# Patient Record
Sex: MALE | Race: WHITE | NOT HISPANIC OR LATINO | Employment: OTHER | URBAN - METROPOLITAN AREA
[De-identification: names, ages, dates, MRNs, and addresses within clinical notes are randomized per-mention and may not be internally consistent; named-entity substitution may affect disease eponyms.]

---

## 2018-02-27 ENCOUNTER — HOSPITAL ENCOUNTER (INPATIENT)
Facility: MEDICAL CENTER | Age: 58
LOS: 3 days | DRG: 247 | End: 2018-03-02
Attending: EMERGENCY MEDICINE | Admitting: HOSPITALIST
Payer: MEDICARE

## 2018-02-27 ENCOUNTER — APPOINTMENT (OUTPATIENT)
Dept: RADIOLOGY | Facility: MEDICAL CENTER | Age: 58
DRG: 247 | End: 2018-02-27
Attending: EMERGENCY MEDICINE
Payer: MEDICARE

## 2018-02-27 ENCOUNTER — RESOLUTE PROFESSIONAL BILLING HOSPITAL PROF FEE (OUTPATIENT)
Dept: HOSPITALIST | Facility: MEDICAL CENTER | Age: 58
End: 2018-02-27
Payer: COMMERCIAL

## 2018-02-27 PROBLEM — R73.9 HYPERGLYCEMIA: Status: ACTIVE | Noted: 2018-02-27

## 2018-02-27 PROBLEM — Z78.9 ALCOHOL USE: Status: ACTIVE | Noted: 2018-02-27

## 2018-02-27 PROBLEM — I21.3 STEMI (ST ELEVATION MYOCARDIAL INFARCTION) (HCC): Status: ACTIVE | Noted: 2018-02-27

## 2018-02-27 PROBLEM — D72.829 LEUKOCYTOSIS: Status: ACTIVE | Noted: 2018-02-27

## 2018-02-27 PROBLEM — I21.09 ACUTE ST ELEVATION MYOCARDIAL INFARCTION (STEMI) OF ANTERIOR WALL (HCC): Status: ACTIVE | Noted: 2018-02-27

## 2018-02-27 PROBLEM — E87.1 HYPONATREMIA: Status: ACTIVE | Noted: 2018-02-27

## 2018-02-27 PROBLEM — F17.200 SMOKER: Status: ACTIVE | Noted: 2018-02-27

## 2018-02-27 LAB
ALBUMIN SERPL BCP-MCNC: 4.7 G/DL (ref 3.2–4.9)
ALBUMIN/GLOB SERPL: 1.6 G/DL
ALP SERPL-CCNC: 94 U/L (ref 30–99)
ALT SERPL-CCNC: 40 U/L (ref 2–50)
ANION GAP SERPL CALC-SCNC: 13 MMOL/L (ref 0–11.9)
APTT PPP: 24.2 SEC (ref 24.7–36)
AST SERPL-CCNC: 73 U/L (ref 12–45)
BASOPHILS # BLD AUTO: 0.4 % (ref 0–1.8)
BASOPHILS # BLD: 0.06 K/UL (ref 0–0.12)
BILIRUB SERPL-MCNC: 0.8 MG/DL (ref 0.1–1.5)
BNP SERPL-MCNC: 191 PG/ML (ref 0–100)
BUN SERPL-MCNC: 8 MG/DL (ref 8–22)
CALCIUM SERPL-MCNC: 9.3 MG/DL (ref 8.5–10.5)
CHLORIDE SERPL-SCNC: 95 MMOL/L (ref 96–112)
CHOLEST SERPL-MCNC: 167 MG/DL (ref 100–199)
CO2 SERPL-SCNC: 22 MMOL/L (ref 20–33)
CREAT SERPL-MCNC: 0.72 MG/DL (ref 0.5–1.4)
EKG IMPRESSION: NORMAL
EOSINOPHIL # BLD AUTO: 0.04 K/UL (ref 0–0.51)
EOSINOPHIL NFR BLD: 0.3 % (ref 0–6.9)
ERYTHROCYTE [DISTWIDTH] IN BLOOD BY AUTOMATED COUNT: 40.1 FL (ref 35.9–50)
EST. AVERAGE GLUCOSE BLD GHB EST-MCNC: 197 MG/DL
GLOBULIN SER CALC-MCNC: 3 G/DL (ref 1.9–3.5)
GLUCOSE BLD-MCNC: 177 MG/DL (ref 65–99)
GLUCOSE BLD-MCNC: 254 MG/DL (ref 65–99)
GLUCOSE SERPL-MCNC: 206 MG/DL (ref 65–99)
HBA1C MFR BLD: 8.5 % (ref 0–5.6)
HCT VFR BLD AUTO: 47.8 % (ref 42–52)
HDLC SERPL-MCNC: 51 MG/DL
HGB BLD-MCNC: 17.2 G/DL (ref 14–18)
IMM GRANULOCYTES # BLD AUTO: 0.08 K/UL (ref 0–0.11)
IMM GRANULOCYTES NFR BLD AUTO: 0.5 % (ref 0–0.9)
INR PPP: 0.98 (ref 0.87–1.13)
LDLC SERPL CALC-MCNC: 93 MG/DL
LIPASE SERPL-CCNC: 17 U/L (ref 11–82)
LYMPHOCYTES # BLD AUTO: 2.17 K/UL (ref 1–4.8)
LYMPHOCYTES NFR BLD: 13.8 % (ref 22–41)
MAGNESIUM SERPL-MCNC: 2.2 MG/DL (ref 1.5–2.5)
MCH RBC QN AUTO: 33.8 PG (ref 27–33)
MCHC RBC AUTO-ENTMCNC: 36 G/DL (ref 33.7–35.3)
MCV RBC AUTO: 93.9 FL (ref 81.4–97.8)
MONOCYTES # BLD AUTO: 1.04 K/UL (ref 0–0.85)
MONOCYTES NFR BLD AUTO: 6.6 % (ref 0–13.4)
NEUTROPHILS # BLD AUTO: 12.3 K/UL (ref 1.82–7.42)
NEUTROPHILS NFR BLD: 78.4 % (ref 44–72)
NRBC # BLD AUTO: 0 K/UL
NRBC BLD-RTO: 0 /100 WBC
PHOSPHATE SERPL-MCNC: 2.4 MG/DL (ref 2.5–4.5)
PLATELET # BLD AUTO: 234 K/UL (ref 164–446)
PMV BLD AUTO: 9.5 FL (ref 9–12.9)
POTASSIUM SERPL-SCNC: 4.4 MMOL/L (ref 3.6–5.5)
PROT SERPL-MCNC: 7.7 G/DL (ref 6–8.2)
PROTHROMBIN TIME: 12.7 SEC (ref 12–14.6)
RBC # BLD AUTO: 5.09 M/UL (ref 4.7–6.1)
SODIUM SERPL-SCNC: 130 MMOL/L (ref 135–145)
TRIGL SERPL-MCNC: 115 MG/DL (ref 0–149)
TROPONIN I SERPL-MCNC: 106.01 NG/ML (ref 0–0.04)
TROPONIN I SERPL-MCNC: 55.58 NG/ML (ref 0–0.04)
TROPONIN I SERPL-MCNC: 9.47 NG/ML (ref 0–0.04)
WBC # BLD AUTO: 15.7 K/UL (ref 4.8–10.8)

## 2018-02-27 PROCEDURE — HZ2ZZZZ DETOXIFICATION SERVICES FOR SUBSTANCE ABUSE TREATMENT: ICD-10-PCS | Performed by: HOSPITALIST

## 2018-02-27 PROCEDURE — 93005 ELECTROCARDIOGRAM TRACING: CPT | Performed by: INTERNAL MEDICINE

## 2018-02-27 PROCEDURE — 80061 LIPID PANEL: CPT

## 2018-02-27 PROCEDURE — 83735 ASSAY OF MAGNESIUM: CPT

## 2018-02-27 PROCEDURE — 80053 COMPREHEN METABOLIC PANEL: CPT

## 2018-02-27 PROCEDURE — C1894 INTRO/SHEATH, NON-LASER: HCPCS

## 2018-02-27 PROCEDURE — 360979 HCHG DIAGNOSTIC CATH

## 2018-02-27 PROCEDURE — 85730 THROMBOPLASTIN TIME PARTIAL: CPT

## 2018-02-27 PROCEDURE — 84484 ASSAY OF TROPONIN QUANT: CPT | Mod: 91

## 2018-02-27 PROCEDURE — A9270 NON-COVERED ITEM OR SERVICE: HCPCS | Performed by: HOSPITALIST

## 2018-02-27 PROCEDURE — C1769 GUIDE WIRE: HCPCS

## 2018-02-27 PROCEDURE — 700102 HCHG RX REV CODE 250 W/ 637 OVERRIDE(OP): Performed by: HOSPITALIST

## 2018-02-27 PROCEDURE — B2111ZZ FLUOROSCOPY OF MULTIPLE CORONARY ARTERIES USING LOW OSMOLAR CONTRAST: ICD-10-PCS | Performed by: INTERNAL MEDICINE

## 2018-02-27 PROCEDURE — 700102 HCHG RX REV CODE 250 W/ 637 OVERRIDE(OP)

## 2018-02-27 PROCEDURE — 700105 HCHG RX REV CODE 258: Performed by: INTERNAL MEDICINE

## 2018-02-27 PROCEDURE — 99285 EMERGENCY DEPT VISIT HI MDM: CPT

## 2018-02-27 PROCEDURE — 700101 HCHG RX REV CODE 250

## 2018-02-27 PROCEDURE — 99152 MOD SED SAME PHYS/QHP 5/>YRS: CPT

## 2018-02-27 PROCEDURE — 700111 HCHG RX REV CODE 636 W/ 250 OVERRIDE (IP)

## 2018-02-27 PROCEDURE — 83036 HEMOGLOBIN GLYCOSYLATED A1C: CPT

## 2018-02-27 PROCEDURE — 82962 GLUCOSE BLOOD TEST: CPT

## 2018-02-27 PROCEDURE — 83690 ASSAY OF LIPASE: CPT

## 2018-02-27 PROCEDURE — C1887 CATHETER, GUIDING: HCPCS

## 2018-02-27 PROCEDURE — 700111 HCHG RX REV CODE 636 W/ 250 OVERRIDE (IP): Performed by: HOSPITALIST

## 2018-02-27 PROCEDURE — B2151ZZ FLUOROSCOPY OF LEFT HEART USING LOW OSMOLAR CONTRAST: ICD-10-PCS | Performed by: INTERNAL MEDICINE

## 2018-02-27 PROCEDURE — 84100 ASSAY OF PHOSPHORUS: CPT

## 2018-02-27 PROCEDURE — 307093 HCHG TR BAND RADIAL

## 2018-02-27 PROCEDURE — 83880 ASSAY OF NATRIURETIC PEPTIDE: CPT

## 2018-02-27 PROCEDURE — A9270 NON-COVERED ITEM OR SERVICE: HCPCS | Performed by: INTERNAL MEDICINE

## 2018-02-27 PROCEDURE — 85610 PROTHROMBIN TIME: CPT

## 2018-02-27 PROCEDURE — 700117 HCHG RX CONTRAST REV CODE 255: Performed by: INTERNAL MEDICINE

## 2018-02-27 PROCEDURE — 85025 COMPLETE CBC W/AUTO DIFF WBC: CPT

## 2018-02-27 PROCEDURE — 71045 X-RAY EXAM CHEST 1 VIEW: CPT

## 2018-02-27 PROCEDURE — 92921 HCHG PRQ CARDIAC ANGIOPLAST ADDL LD: CPT | Mod: LD

## 2018-02-27 PROCEDURE — 700102 HCHG RX REV CODE 250 W/ 637 OVERRIDE(OP): Performed by: INTERNAL MEDICINE

## 2018-02-27 PROCEDURE — C1874 STENT, COATED/COV W/DEL SYS: HCPCS

## 2018-02-27 PROCEDURE — C9606 PERC D-E COR REVASC W AMI S: HCPCS | Mod: LD

## 2018-02-27 PROCEDURE — 99223 1ST HOSP IP/OBS HIGH 75: CPT | Mod: AI | Performed by: HOSPITALIST

## 2018-02-27 PROCEDURE — 770022 HCHG ROOM/CARE - ICU (200)

## 2018-02-27 PROCEDURE — 304952 HCHG R 2 PADS

## 2018-02-27 PROCEDURE — 93005 ELECTROCARDIOGRAM TRACING: CPT | Performed by: EMERGENCY MEDICINE

## 2018-02-27 PROCEDURE — 99153 MOD SED SAME PHYS/QHP EA: CPT

## 2018-02-27 PROCEDURE — 93458 L HRT ARTERY/VENTRICLE ANGIO: CPT

## 2018-02-27 PROCEDURE — 4A023N7 MEASUREMENT OF CARDIAC SAMPLING AND PRESSURE, LEFT HEART, PERCUTANEOUS APPROACH: ICD-10-PCS | Performed by: INTERNAL MEDICINE

## 2018-02-27 PROCEDURE — 02703ZZ DILATION OF CORONARY ARTERY, ONE ARTERY, PERCUTANEOUS APPROACH: ICD-10-PCS | Performed by: INTERNAL MEDICINE

## 2018-02-27 PROCEDURE — A9270 NON-COVERED ITEM OR SERVICE: HCPCS

## 2018-02-27 PROCEDURE — 0270346 DILATION OF CORONARY ARTERY, ONE ARTERY, BIFURCATION, WITH DRUG-ELUTING INTRALUMINAL DEVICE, PERCUTANEOUS APPROACH: ICD-10-PCS | Performed by: INTERNAL MEDICINE

## 2018-02-27 PROCEDURE — C1725 CATH, TRANSLUMIN NON-LASER: HCPCS

## 2018-02-27 PROCEDURE — 700111 HCHG RX REV CODE 636 W/ 250 OVERRIDE (IP): Mod: JG | Performed by: INTERNAL MEDICINE

## 2018-02-27 PROCEDURE — 93010 ELECTROCARDIOGRAM REPORT: CPT | Mod: 76 | Performed by: INTERNAL MEDICINE

## 2018-02-27 RX ORDER — BISACODYL 10 MG
10 SUPPOSITORY, RECTAL RECTAL
Status: DISCONTINUED | OUTPATIENT
Start: 2018-02-27 | End: 2018-03-02 | Stop reason: HOSPADM

## 2018-02-27 RX ORDER — FOLIC ACID 1 MG/1
1 TABLET ORAL DAILY
Status: COMPLETED | OUTPATIENT
Start: 2018-02-27 | End: 2018-03-02

## 2018-02-27 RX ORDER — LORAZEPAM 1 MG/1
1 TABLET ORAL EVERY 4 HOURS PRN
Status: DISCONTINUED | OUTPATIENT
Start: 2018-02-27 | End: 2018-03-01

## 2018-02-27 RX ORDER — THIAMINE MONONITRATE (VIT B1) 100 MG
100 TABLET ORAL DAILY
Status: COMPLETED | OUTPATIENT
Start: 2018-02-27 | End: 2018-03-02

## 2018-02-27 RX ORDER — AMOXICILLIN 250 MG
2 CAPSULE ORAL 2 TIMES DAILY
Status: DISCONTINUED | OUTPATIENT
Start: 2018-02-27 | End: 2018-03-02 | Stop reason: HOSPADM

## 2018-02-27 RX ORDER — MIDAZOLAM HYDROCHLORIDE 1 MG/ML
INJECTION INTRAMUSCULAR; INTRAVENOUS
Status: COMPLETED
Start: 2018-02-27 | End: 2018-02-27

## 2018-02-27 RX ORDER — LORAZEPAM 2 MG/ML
1 INJECTION INTRAMUSCULAR
Status: DISCONTINUED | OUTPATIENT
Start: 2018-02-27 | End: 2018-03-01

## 2018-02-27 RX ORDER — LORAZEPAM 1 MG/1
4 TABLET ORAL
Status: DISCONTINUED | OUTPATIENT
Start: 2018-02-27 | End: 2018-03-01

## 2018-02-27 RX ORDER — ONDANSETRON 2 MG/ML
4 INJECTION INTRAMUSCULAR; INTRAVENOUS EVERY 4 HOURS PRN
Status: DISCONTINUED | OUTPATIENT
Start: 2018-02-27 | End: 2018-03-02 | Stop reason: HOSPADM

## 2018-02-27 RX ORDER — ATORVASTATIN CALCIUM 80 MG/1
80 TABLET, FILM COATED ORAL
Status: DISCONTINUED | OUTPATIENT
Start: 2018-02-27 | End: 2018-03-02 | Stop reason: HOSPADM

## 2018-02-27 RX ORDER — NICOTINE 21 MG/24HR
14 PATCH, TRANSDERMAL 24 HOURS TRANSDERMAL
Status: DISCONTINUED | OUTPATIENT
Start: 2018-02-27 | End: 2018-02-28

## 2018-02-27 RX ORDER — LORAZEPAM 2 MG/ML
2 INJECTION INTRAMUSCULAR
Status: DISCONTINUED | OUTPATIENT
Start: 2018-02-27 | End: 2018-03-01

## 2018-02-27 RX ORDER — LORAZEPAM 1 MG/1
3 TABLET ORAL
Status: DISCONTINUED | OUTPATIENT
Start: 2018-02-27 | End: 2018-03-01

## 2018-02-27 RX ORDER — CARVEDILOL 6.25 MG/1
6.25 TABLET ORAL 2 TIMES DAILY WITH MEALS
Status: DISCONTINUED | OUTPATIENT
Start: 2018-02-27 | End: 2018-03-02 | Stop reason: HOSPADM

## 2018-02-27 RX ORDER — LORAZEPAM 1 MG/1
2 TABLET ORAL
Status: DISCONTINUED | OUTPATIENT
Start: 2018-02-27 | End: 2018-03-01

## 2018-02-27 RX ORDER — LISINOPRIL 10 MG/1
10 TABLET ORAL
Status: DISCONTINUED | OUTPATIENT
Start: 2018-02-27 | End: 2018-03-02 | Stop reason: HOSPADM

## 2018-02-27 RX ORDER — DEXTROSE MONOHYDRATE 25 G/50ML
25 INJECTION, SOLUTION INTRAVENOUS
Status: DISCONTINUED | OUTPATIENT
Start: 2018-02-27 | End: 2018-03-02 | Stop reason: HOSPADM

## 2018-02-27 RX ORDER — LABETALOL HYDROCHLORIDE 5 MG/ML
10 INJECTION, SOLUTION INTRAVENOUS EVERY 6 HOURS PRN
Status: DISCONTINUED | OUTPATIENT
Start: 2018-02-27 | End: 2018-03-02 | Stop reason: HOSPADM

## 2018-02-27 RX ORDER — MORPHINE SULFATE 4 MG/ML
2 INJECTION, SOLUTION INTRAMUSCULAR; INTRAVENOUS EVERY 4 HOURS PRN
Status: DISCONTINUED | OUTPATIENT
Start: 2018-02-27 | End: 2018-03-02 | Stop reason: HOSPADM

## 2018-02-27 RX ORDER — VERAPAMIL HYDROCHLORIDE 2.5 MG/ML
INJECTION, SOLUTION INTRAVENOUS
Status: COMPLETED
Start: 2018-02-27 | End: 2018-02-27

## 2018-02-27 RX ORDER — SODIUM CHLORIDE 9 MG/ML
INJECTION, SOLUTION INTRAVENOUS CONTINUOUS
Status: DISPENSED | OUTPATIENT
Start: 2018-02-27 | End: 2018-02-27

## 2018-02-27 RX ORDER — LORAZEPAM 2 MG/ML
0.5 INJECTION INTRAMUSCULAR EVERY 4 HOURS PRN
Status: DISCONTINUED | OUTPATIENT
Start: 2018-02-27 | End: 2018-03-01

## 2018-02-27 RX ORDER — PROMETHAZINE HYDROCHLORIDE 25 MG/1
12.5-25 TABLET ORAL EVERY 4 HOURS PRN
Status: DISCONTINUED | OUTPATIENT
Start: 2018-02-27 | End: 2018-03-02 | Stop reason: HOSPADM

## 2018-02-27 RX ORDER — LORAZEPAM 2 MG/ML
1.5 INJECTION INTRAMUSCULAR
Status: DISCONTINUED | OUTPATIENT
Start: 2018-02-27 | End: 2018-03-01

## 2018-02-27 RX ORDER — POLYETHYLENE GLYCOL 3350 17 G/17G
1 POWDER, FOR SOLUTION ORAL
Status: DISCONTINUED | OUTPATIENT
Start: 2018-02-27 | End: 2018-03-02 | Stop reason: HOSPADM

## 2018-02-27 RX ORDER — PRASUGREL 10 MG/1
10 TABLET, FILM COATED ORAL DAILY
Status: DISCONTINUED | OUTPATIENT
Start: 2018-02-28 | End: 2018-03-02 | Stop reason: HOSPADM

## 2018-02-27 RX ORDER — LIDOCAINE HYDROCHLORIDE 20 MG/ML
INJECTION, SOLUTION INFILTRATION; PERINEURAL
Status: COMPLETED
Start: 2018-02-27 | End: 2018-02-27

## 2018-02-27 RX ORDER — ACETAMINOPHEN 325 MG/1
650 TABLET ORAL EVERY 6 HOURS PRN
Status: DISCONTINUED | OUTPATIENT
Start: 2018-02-27 | End: 2018-03-02 | Stop reason: HOSPADM

## 2018-02-27 RX ORDER — IPRATROPIUM BROMIDE AND ALBUTEROL SULFATE 2.5; .5 MG/3ML; MG/3ML
3 SOLUTION RESPIRATORY (INHALATION)
Status: DISCONTINUED | OUTPATIENT
Start: 2018-02-27 | End: 2018-03-02 | Stop reason: HOSPADM

## 2018-02-27 RX ORDER — PROMETHAZINE HYDROCHLORIDE 25 MG/1
12.5-25 SUPPOSITORY RECTAL EVERY 4 HOURS PRN
Status: DISCONTINUED | OUTPATIENT
Start: 2018-02-27 | End: 2018-03-02 | Stop reason: HOSPADM

## 2018-02-27 RX ORDER — LORAZEPAM 1 MG/1
0.5 TABLET ORAL EVERY 4 HOURS PRN
Status: DISCONTINUED | OUTPATIENT
Start: 2018-02-27 | End: 2018-03-01

## 2018-02-27 RX ORDER — PRASUGREL 10 MG/1
TABLET, FILM COATED ORAL
Status: COMPLETED
Start: 2018-02-27 | End: 2018-02-27

## 2018-02-27 RX ORDER — HEPARIN SODIUM,PORCINE 1000/ML
VIAL (ML) INJECTION
Status: COMPLETED
Start: 2018-02-27 | End: 2018-02-27

## 2018-02-27 RX ORDER — ONDANSETRON 2 MG/ML
4 INJECTION INTRAMUSCULAR; INTRAVENOUS EVERY 4 HOURS PRN
Status: DISCONTINUED | OUTPATIENT
Start: 2018-02-27 | End: 2018-02-27

## 2018-02-27 RX ORDER — ONDANSETRON 4 MG/1
4 TABLET, ORALLY DISINTEGRATING ORAL EVERY 4 HOURS PRN
Status: DISCONTINUED | OUTPATIENT
Start: 2018-02-27 | End: 2018-03-02 | Stop reason: HOSPADM

## 2018-02-27 RX ORDER — PRASUGREL 10 MG/1
60 TABLET, FILM COATED ORAL ONCE
Status: DISCONTINUED | OUTPATIENT
Start: 2018-02-27 | End: 2018-02-27

## 2018-02-27 RX ORDER — TRAMADOL HYDROCHLORIDE 50 MG/1
50 TABLET ORAL EVERY 6 HOURS PRN
Status: DISCONTINUED | OUTPATIENT
Start: 2018-02-27 | End: 2018-03-02 | Stop reason: HOSPADM

## 2018-02-27 RX ADMIN — INSULIN HUMAN 1 UNITS: 100 INJECTION, SOLUTION PARENTERAL at 20:40

## 2018-02-27 RX ADMIN — NITROGLYCERIN 10 ML: 20 INJECTION INTRAVENOUS at 08:52

## 2018-02-27 RX ADMIN — FENTANYL CITRATE 50 MCG: 50 INJECTION, SOLUTION INTRAMUSCULAR; INTRAVENOUS at 09:27

## 2018-02-27 RX ADMIN — SODIUM CHLORIDE: 9 INJECTION, SOLUTION INTRAVENOUS at 12:22

## 2018-02-27 RX ADMIN — CARVEDILOL 6.25 MG: 6.25 TABLET, FILM COATED ORAL at 14:12

## 2018-02-27 RX ADMIN — VERAPAMIL HYDROCHLORIDE 5 MG: 2.5 INJECTION, SOLUTION INTRAVENOUS at 08:52

## 2018-02-27 RX ADMIN — INSULIN HUMAN 3 UNITS: 100 INJECTION, SOLUTION PARENTERAL at 17:58

## 2018-02-27 RX ADMIN — LISINOPRIL 10 MG: 10 TABLET ORAL at 14:12

## 2018-02-27 RX ADMIN — HEPARIN SODIUM: 1000 INJECTION, SOLUTION INTRAVENOUS; SUBCUTANEOUS at 08:52

## 2018-02-27 RX ADMIN — BIVALIRUDIN 1.75 MG/KG/HR: 250 INJECTION, POWDER, LYOPHILIZED, FOR SOLUTION INTRAVENOUS at 09:04

## 2018-02-27 RX ADMIN — MIDAZOLAM 2 MG: 1 INJECTION INTRAMUSCULAR; INTRAVENOUS at 09:21

## 2018-02-27 RX ADMIN — Medication 60 MG: at 09:30

## 2018-02-27 RX ADMIN — IOHEXOL 186 ML: 350 INJECTION, SOLUTION INTRAVENOUS at 09:27

## 2018-02-27 RX ADMIN — HEPARIN SODIUM 2000 UNITS: 200 INJECTION, SOLUTION INTRAVENOUS at 08:53

## 2018-02-27 RX ADMIN — Medication 100 MG: at 12:33

## 2018-02-27 RX ADMIN — LORAZEPAM 0.5 MG: 1 TABLET ORAL at 20:23

## 2018-02-27 RX ADMIN — FOLIC ACID 1 MG: 1 TABLET ORAL at 12:33

## 2018-02-27 RX ADMIN — MORPHINE SULFATE 2 MG: 4 INJECTION INTRAVENOUS at 12:18

## 2018-02-27 RX ADMIN — LORAZEPAM 2 MG: 1 TABLET ORAL at 18:37

## 2018-02-27 RX ADMIN — THERA TABS 1 TABLET: TAB at 12:33

## 2018-02-27 RX ADMIN — ATORVASTATIN CALCIUM 80 MG: 80 TABLET, FILM COATED ORAL at 20:36

## 2018-02-27 RX ADMIN — TRAMADOL HYDROCHLORIDE 50 MG: 50 TABLET, COATED ORAL at 15:16

## 2018-02-27 RX ADMIN — NICOTINE 14 MG: 14 PATCH, EXTENDED RELEASE TRANSDERMAL at 12:35

## 2018-02-27 RX ADMIN — LIDOCAINE HYDROCHLORIDE: 20 INJECTION, SOLUTION INFILTRATION; PERINEURAL at 08:52

## 2018-02-27 RX ADMIN — FENTANYL CITRATE 100 MCG: 50 INJECTION, SOLUTION INTRAMUSCULAR; INTRAVENOUS at 09:21

## 2018-02-27 ASSESSMENT — LIFESTYLE VARIABLES
ORIENTATION AND CLOUDING OF SENSORIUM: ORIENTED AND CAN DO SERIAL ADDITIONS
PAROXYSMAL SWEATS: *
AGITATION: MODERATELY FIDGETY AND RESTLESS
EVER HAD A DRINK FIRST THING IN THE MORNING TO STEADY YOUR NERVES TO GET RID OF A HANGOVER: NO
AVERAGE NUMBER OF DAYS PER WEEK YOU HAVE A DRINK CONTAINING ALCOHOL: 7
NAUSEA AND VOMITING: NO NAUSEA AND NO VOMITING
PAROXYSMAL SWEATS: *
TOTAL SCORE: 2
HEADACHE, FULLNESS IN HEAD: NOT PRESENT
DOES PATIENT WANT TO STOP DRINKING: NO
TOTAL SCORE: 0
ORIENTATION AND CLOUDING OF SENSORIUM: ORIENTED AND CAN DO SERIAL ADDITIONS
EVER_SMOKED: YES
TOTAL SCORE: 0
VISUAL DISTURBANCES: NOT PRESENT
PAROXYSMAL SWEATS: NO SWEAT VISIBLE
HEADACHE, FULLNESS IN HEAD: NOT PRESENT
HOW MANY TIMES IN THE PAST YEAR HAVE YOU HAD 5 OR MORE DRINKS IN A DAY: 200
HAVE PEOPLE ANNOYED YOU BY CRITICIZING YOUR DRINKING: YES
ALCOHOL_USE: YES
ANXIETY: MODERATELY ANXIOUS OR GUARDED, SO ANXIETY IS INFERRED
NAUSEA AND VOMITING: NO NAUSEA AND NO VOMITING
PAROXYSMAL SWEATS: NO SWEAT VISIBLE
TOTAL SCORE: 3
HAVE YOU EVER FELT YOU SHOULD CUT DOWN ON YOUR DRINKING: NO
AGITATION: NORMAL ACTIVITY
TOTAL SCORE: 5
NAUSEA AND VOMITING: NO NAUSEA AND NO VOMITING
TREMOR: NO TREMOR
VISUAL DISTURBANCES: NOT PRESENT
ON A TYPICAL DAY WHEN YOU DRINK ALCOHOL HOW MANY DRINKS DO YOU HAVE: 3
TREMOR: NO TREMOR
HAVE PEOPLE ANNOYED YOU BY CRITICIZING YOUR DRINKING: NO
EVER HAD A DRINK FIRST THING IN THE MORNING TO STEADY YOUR NERVES TO GET RID OF A HANGOVER: NO
ANXIETY: MILDLY ANXIOUS
TREMOR: NO TREMOR
HEADACHE, FULLNESS IN HEAD: NOT PRESENT
NAUSEA AND VOMITING: NO NAUSEA AND NO VOMITING
AUDITORY DISTURBANCES: NOT PRESENT
DO YOU DRINK ALCOHOL: YES
VISUAL DISTURBANCES: NOT PRESENT
AGITATION: SOMEWHAT MORE THAN NORMAL ACTIVITY
VISUAL DISTURBANCES: NOT PRESENT
CONSUMPTION TOTAL: POSITIVE
EVER FELT BAD OR GUILTY ABOUT YOUR DRINKING: NO
TOTAL SCORE: 11
ANXIETY: MILDLY ANXIOUS
TOTAL SCORE: 2
AUDITORY DISTURBANCES: NOT PRESENT
AVERAGE NUMBER OF DAYS PER WEEK YOU HAVE A DRINK CONTAINING ALCOHOL: 5
ORIENTATION AND CLOUDING OF SENSORIUM: ORIENTED AND CAN DO SERIAL ADDITIONS
AGITATION: *
ANXIETY: *
ORIENTATION AND CLOUDING OF SENSORIUM: ORIENTED AND CAN DO SERIAL ADDITIONS
ON A TYPICAL DAY WHEN YOU DRINK ALCOHOL HOW MANY DRINKS DO YOU HAVE: 9
TREMOR: NO TREMOR
TOTAL SCORE: 2
HEADACHE, FULLNESS IN HEAD: NOT PRESENT
TOTAL SCORE: 0
HOW MANY TIMES IN THE PAST YEAR HAVE YOU HAD 5 OR MORE DRINKS IN A DAY: 260
EVER FELT BAD OR GUILTY ABOUT YOUR DRINKING: NO
CONSUMPTION TOTAL: POSITIVE
TOTAL SCORE: 2
AUDITORY DISTURBANCES: NOT PRESENT
AUDITORY DISTURBANCES: NOT PRESENT
HAVE YOU EVER FELT YOU SHOULD CUT DOWN ON YOUR DRINKING: YES

## 2018-02-27 ASSESSMENT — ENCOUNTER SYMPTOMS
DOUBLE VISION: 0
HEMOPTYSIS: 0
FEVER: 0
MYALGIAS: 0
COUGH: 0
DEPRESSION: 0
HEARTBURN: 0
BRUISES/BLEEDS EASILY: 0
BLURRED VISION: 0
PND: 0
NECK PAIN: 0
CHILLS: 0
DIZZINESS: 0
HEADACHES: 0

## 2018-02-27 ASSESSMENT — PATIENT HEALTH QUESTIONNAIRE - PHQ9
1. LITTLE INTEREST OR PLEASURE IN DOING THINGS: NOT AT ALL
SUM OF ALL RESPONSES TO PHQ QUESTIONS 1-9: 0
SUM OF ALL RESPONSES TO PHQ9 QUESTIONS 1 AND 2: 0
2. FEELING DOWN, DEPRESSED, IRRITABLE, OR HOPELESS: NOT AT ALL
2. FEELING DOWN, DEPRESSED, IRRITABLE, OR HOPELESS: NOT AT ALL
1. LITTLE INTEREST OR PLEASURE IN DOING THINGS: NOT AT ALL
SUM OF ALL RESPONSES TO PHQ9 QUESTIONS 1 AND 2: 0
SUM OF ALL RESPONSES TO PHQ QUESTIONS 1-9: 0

## 2018-02-27 ASSESSMENT — PAIN SCALES - GENERAL
PAINLEVEL_OUTOF10: 7
PAINLEVEL_OUTOF10: 3
PAINLEVEL_OUTOF10: 3
PAINLEVEL_OUTOF10: 4
PAINLEVEL_OUTOF10: 2
PAINLEVEL_OUTOF10: 4
PAINLEVEL_OUTOF10: 4
PAINLEVEL_OUTOF10: 5
PAINLEVEL_OUTOF10: 3
PAINLEVEL_OUTOF10: 4
PAINLEVEL_OUTOF10: 4
PAINLEVEL_OUTOF10: 3
PAINLEVEL_OUTOF10: 0

## 2018-02-27 NOTE — PROGRESS NOTES
Patient recovering in cath lab holding room while CIC room gets clean. Patient states chest pain is 4 out of 10 down from 7 out of 10 prior to cath procedure. Vital signs stable-see MAR for documentation. Report given to NAT Renner. No complaints from patient at this time.

## 2018-02-27 NOTE — ASSESSMENT & PLAN NOTE
Smokes 10 cigarettes daily, he was advised to quit smoking and counseled to quit for 3 min. Nicotine patch.

## 2018-02-27 NOTE — PROGRESS NOTES
"Lab called for troponin results. Troponin result  is 106.01. Pain currently 5/10 \"aching.\" pt requests morphine. Morphine not available. tramadol given. Pt calm and resting in bed at this time.   "

## 2018-02-27 NOTE — CONSULTS
DATE OF SERVICE:  2018    REQUESTING PHYSICIAN:  Dr. Tyler, emergency room physician.    REASON FOR CONSULTATION:  Chest discomfort with ST elevation.    HISTORY OF PRESENT ILLNESS:  The patient is a 57 years old  male with   history of hypertension and tobacco abuse, who was brought in by [facility] for   evaluation of chest pain and ST elevation on EKG.    The patient stated that he has been having intermittent chest pain for about a week   or so.  It is felt like a pressure in his chest without radiation. It is probably   aggravated by exertion, lasting 5-10 minutes.    Since last evening about 5:00 p.m., the pain has been relatively constant.  Because of that, he was unable to sleep. He continued to have discomfort this   morning and decided to call 911 for help. He currently is still having 5-6/10 pain.  Initial electrocardiogram by my review showed ST elevation in the anterior leads,   most predominant in lead V2 up to about 2 mm, although most of the R waves   In the anterior leads have been lost.    The patient has been given aspirin and nitroglycerin in route.    He denied any recent unusual strenuous activity. He has not been compliant with his   medications.  According to the [facility], the medicine bottles at his home were   mostly .    ALLERGIES:  He denied any known drug allergies.    MEDICATIONS:  His home medications that he was reportedly supposed to take   include verapamil, atenolol, hydrochlorothiazide, and atorvastatin.    PAST MEDICAL HISTORY:  Denied prior stroke, bleeding issues, diabetes   mellitus, chronic lung, liver, or kidney disease.    SOCIAL HISTORY:  Smokes one-half pack per day, but denied alcoholic or drug   use.    FAMILY HISTORY:  He said he is adopted and unaware of any family history.    REVIEW OF SYSTEMS:  Denied fever, chills, unusual strenuous activity,   palpitations, dizziness, shortness of breath, hematemesis, melena, focal   weakness or numbness.  All  other systems are negative.    PHYSICAL EXAMINATION:  GENERAL:  Reveals a 57-year-old  male, not in acute distress, not   dyspneic at rest, alert and oriented x3.  VITAL SIGNS:  Blood pressure 150/112, heart rate 95, and respirations 16.  HEENT:  Head atraumatic, normocephalic.  NECK:  Supple, no JVD, no carotid bruits, no thyromegaly, no lymphadenopathy.  LUNGS:  Good expansion.  No rales or wheezing.  HEART:  Distant sounds, regular rate and rhythm.  No murmur, rub, or gallop.  ABDOMEN:  Soft.  No tenderness, masses, or bruits.  EXTREMITIES:  No clubbing, cyanosis, or edema.  Symmetrical peripheral pulses.  NEUROLOGIC:  Grossly intact.  SKIN:  No ecchymosis.    LABORATORY DATA:  Electrocardiogram as mentioned above.  All other labs still   pending at this time.    IMPRESSION:  1.  Anterior ST elevation myocardial infarction, late presentation, probably   more than 12-hour duration, but with persistent pain.  I advised him to   undergo emergent cardiac catheterization and coronary intervention as   indicated.  Risks and benefits of procedure were discussed with him.  He   understood, accepted the risks, and wished to proceed.  2.  History of hypertension.  He appeared to be noncompliant.  Blood pressure   is moderately elevated.  He will be placed on beta-blocker prior to some ACE   inhibitor given myocardial infarction.  3.  History of tobacco abuse.    PLAN:  He will be taken emergently to catheterization laboratory for coronary   angiography and intervention.  He will be admitted to cardiac intensive care   unit following the procedure.  He will be started on aspirin, statin,   beta-blocker, and prior ACE inhibitor.  He will be advised about smoking   cessation.  Further recommendations will be discussed after completion of the   procedure.       ____________________________________     MD BLANE BONNER / MAYURI    DD:  02/27/2018 08:53:15  DT:  02/27/2018 09:55:57    D#:  [id number]  Job#:   [id number]

## 2018-02-27 NOTE — PROCEDURES
REFERRING PHYSICIAN: Dr. Chandler    PREOPERATIVE DIAGNOSIS:  1. Anterior ST elevation myocardial infarction, delayed presentation  2. Hypertension      POSTOPERATIVE DIAGNOSIS:  1. 100% occluded mid LAD culprit stenosis  2. LVEF 40% with anteroapical dyskinesis prior to intervention  3. Moderate nonobstructive RCA stenosis  4. Successful PCI culprit LAD-D1 bifurcation with placement of a 3.0 x 38 mm Xience alpine GRISELDA postdilated to 3.5 mm proximally and PTCA of the involved side branch.      PROCEDURE PERFORMED:  Selective coronary angiography of the native vessels  Left heart catheterization  Left ventriculogram  PCI of LAD GRISELDA     DESCRIPTION OF PROCEDURE:  The risks and benefits of cardiac catheterization as well as the procedure itself, rationale and appropriateness were discussed with the patient today. Complications including but not limited to death, stroke, MI, urgent bypass surgery, contrast nephropathy, vascular complications, bleeding and infection were explained to the patient. The potential outcomes associated with the procedure (possible PCI, possible CABG, possible medical Rx only) were also discussed at length. The patient agreed to proceed.    The patient was transported to the catheterization laboratory in emergency fashion from the emergency department. The right radial area and right groin were prepped and draped in the usual fashion. Right radial area was entered with a single through and through puncture and a 6F glide sheath was placed. An intra-arterial cocktail of heparin, verapamil and nitroglycerine was administered. Over a wire, a 6 Bengali JR4 diagnostic catheter was passed to the aortic root and used to engage the right coronary without difficulty. Contrast was administered and multiple images obtained. This catheter was then used to cross the aortic valve for LHC and contrast was administered at 8cc/s for 24cc's for left ventriculogram. Was exchanged for an EBU 3.5 guiding catheter, 6  Charli, seated appropriately. Diagnostic films were obtained     DESCRIPTION OF PCI:  The decision was made to intervene on the culprit coronary artery. Bivalirudin bolus and infusion was initiated. A BMW 0.014 floppy tip wire was navigated across the culprit stenosis. A 2.5 x 15 mm balloon was used to predilate the lesion. MW wire was navigated into the involved 1st diagonal which had a 98% ostial stenosis. A 2.0 x 12 mm compliant balloon was used to treat the ostium of the diagonal. Resulted in excellent angiographic outcome. A 3.0 x 38 mm Xience alpine drug-eluting stent was then positioned and deployed at nominal pressure followed by high-pressure inflations. Following this a 3.0 x 15 mm noncompliant balloon was used to post dilate the distal stent stented segment, followed by a 3.5 x 20 mm noncompliant balloon to post-dilate the proximal segments. There was an excellent angiographic result with LOLA-3 flow and no residual stenosis in the stented segment or involved side branch.     All catheters and guidewires were removed and a TR band was applied to achieve patent hemostasis. Patient left the cath lab in stable condition.        Moderate sedation directly monitored by me during the case while supervising the administration of the sedation medication by an independent trained RN to assist in the monitoring of the patient's level of consciousness and physiological status. I, the supervising physician was present the entire time from beginning of medication administration until the end of the procedure from 8:52 AM until 9:26 AM. For detailed administration records please see the moderate sedation documentation in the median tab.      FINDINGS:  I. HEMODYNAMICS:    Ao: 124/95 mmHg   LEDP: 21 mmHg   Gradient on LV pullback: No    II. LEFT VENTRICULOGRAM:   LVEF HERRERA PROJECTION: 40% with mid anterior, anteroapical and inferoapical dyskinesis.     III. CORONARY ANGIOGRAPHY:  Left Main: Large but short bifurcating no  CAD.  Left Anterior Descending: Large wraps around the apex. 100% occluded with heavy calcification in its midportion at the site of the takeoff of a diagonal which is also occluded. The proximal vessel has a 30-40% eccentric stenosis. Postintervention the large diagonal and LAD have 0% residual stenosis in the intervening segments and LOLA-3 flow.  Left Circumflex: Large and dominant with a 30% midportion stenosis after the takeoff of a large obtuse marginal. Diffuse nonobstructive disease otherwise.  Right Coronary: Small to moderate size nondominant with a 30% midportion stenosis.    COMPLICATIONS: none apparent    CONCLUSIONS:  1. 100% occluded mid LAD culprit stenosis  2. LVEF 40% with anteroapical dyskinesis prior to intervention  3. Moderate nonobstructive RCA stenosis  4. Successful PCI culprit LAD-D1 bifurcation with placement of a 3.0 x 38 mm Xience alpine GRISELDA postdilated to 3.5 mm proximally and PTCA of the involved side branch.

## 2018-02-27 NOTE — ED PROVIDER NOTES
ED Provider Note    Scribed for Jerson Tyler D.O. by Christian Richards. 2018  8:36 AM    Primary care provider: No primary care provider on file.  Means of arrival: EMS  History obtained from: patient  History limited by: none    CHIEF COMPLAINT     Chest pain     STEMI    HPI  Brigido Dudley is a 57 y.o. male who presents to the Emergency Department complaining of constant chest pain starting last night at 5 pm. Patient reports associated lightheadedness. He states  That he adminstered Tylenol with no relief to his chest pain. Patient states that he has had intermittent chest pain for the last 7 days. Last night he began to experience a more severe episode of chest pain that lasted throughout the night. Patient called EMS today whenhis pain became severe. EMS noted the patient has ST elevation in leads V1-3 and called for STEMI prealert. They administered 324 mg aspirin, 2 doses of nitroglycerin en route. Per EMS the patient has a history of hypertension, but has not taken his prescribed medications in 1 year secondary to having only  prescriptions. He is a half pack per day cigarette user, alcohol user. Patient denies nausea, vomiting, history of MI.      REVIEW OF SYSTEMS  Pertinent positives include chest pain. Pertinent negatives include no nausea, vomiting.  All other systems reviewed and negative.  C.    PAST MEDICAL HISTORY  hypertension     SURGICAL HISTORY  None noted     SOCIAL HISTORY  Social History   Substance Use Topics   • Smoking status: Yes - half pack per day   • Smokeless tobacco: None noted   • Alcohol use None noted      History   Drug use: None noted       FAMILY HISTORY  None noted    CURRENT MEDICATIONS  Current Facility-Administered Medications:   •  HEPARIN 1000 UNITS/ML OR USE ONLY, , , ,   •  LIDOCAINE HCL 2 % INJ SOLN, , , ,   •  HEPARIN (PORCINE) IN NACL 2-0.9 UNIT/ML-% INJ SOLN, , , ,   •  NITROGLYCERIN 2 MG IV SOLN, , , ,   •  VERAPAMIL HCL 2.5 MG/ML IV SOLN, ,  ", ,   No current outpatient prescriptions on file.    ALLERGIES  None noted    PHYSICAL EXAM  VITAL SIGNS: Pulse 95   Temp 36.3 °C (97.4 °F)   Resp (!) 21   Ht 1.651 m (5' 5\")   Wt 63.8 kg (140 lb 10.5 oz)   SpO2 96%   BMI 23.41 kg/m²     Nursing notes and vitals reviewed.  Constitutional: Well developed, Well nourished, No acute distress, Non-toxic appearance.   Eyes: PERRLA, EOMI, Conjunctiva normal, No discharge.   Cardiovascular: Normal heart rate, Normal rhythm, No murmurs, No rubs, No gallops.   Thorax & Lungs: No respiratory distress, No rales, No rhonchi, No wheezing, No chest tenderness.   Abdomen: Bowel sounds normal, Soft, No tenderness, No guarding, No rebound, No masses, No pulsatile masses.   Skin: Warm, Dry, No erythema, No rash.   Musculoskeletal: Intact distal pulses, No edema, No cyanosis, No clubbing. Good range of motion in all major joints. No tenderness to palpation or major deformities noted, no CVA tenderness, no midline back tenderness.   Neurologic: Alert & oriented x 3, Normal motor function, Normal sensory function, No focal deficits noted.  Psychiatric: Affect normal for clinical presentation.    DIAGNOSTIC STUDIES/PROCEDURES    LABS  Results for orders placed or performed during the hospital encounter of 02/27/18   CBC w/ Differential   Result Value Ref Range    WBC 15.7 (H) 4.8 - 10.8 K/uL    RBC 5.09 4.70 - 6.10 M/uL    Hemoglobin 17.2 14.0 - 18.0 g/dL    Hematocrit 47.8 42.0 - 52.0 %    MCV 93.9 81.4 - 97.8 fL    MCH 33.8 (H) 27.0 - 33.0 pg    MCHC 36.0 (H) 33.7 - 35.3 g/dL    RDW 40.1 35.9 - 50.0 fL    Platelet Count 234 164 - 446 K/uL    MPV 9.5 9.0 - 12.9 fL    Neutrophils-Polys 78.40 (H) 44.00 - 72.00 %    Lymphocytes 13.80 (L) 22.00 - 41.00 %    Monocytes 6.60 0.00 - 13.40 %    Eosinophils 0.30 0.00 - 6.90 %    Basophils 0.40 0.00 - 1.80 %    Immature Granulocytes 0.50 0.00 - 0.90 %    Nucleated RBC 0.00 /100 WBC    Neutrophils (Absolute) 12.30 (H) 1.82 - 7.42 K/uL    " Lymphs (Absolute) 2.17 1.00 - 4.80 K/uL    Monos (Absolute) 1.04 (H) 0.00 - 0.85 K/uL    Eos (Absolute) 0.04 0.00 - 0.51 K/uL    Baso (Absolute) 0.06 0.00 - 0.12 K/uL    Immature Granulocytes (abs) 0.08 0.00 - 0.11 K/uL    NRBC (Absolute) 0.00 K/uL   Complete Metabolic Panel (CMP)   Result Value Ref Range    Sodium 130 (L) 135 - 145 mmol/L    Potassium 4.4 3.6 - 5.5 mmol/L    Chloride 95 (L) 96 - 112 mmol/L    Co2 22 20 - 33 mmol/L    Anion Gap 13.0 (H) 0.0 - 11.9    Glucose 206 (H) 65 - 99 mg/dL    Bun 8 8 - 22 mg/dL    Creatinine 0.72 0.50 - 1.40 mg/dL    Calcium 9.3 8.5 - 10.5 mg/dL    AST(SGOT) 73 (H) 12 - 45 U/L    ALT(SGPT) 40 2 - 50 U/L    Alkaline Phosphatase 94 30 - 99 U/L    Total Bilirubin 0.8 0.1 - 1.5 mg/dL    Albumin 4.7 3.2 - 4.9 g/dL    Total Protein 7.7 6.0 - 8.2 g/dL    Globulin 3.0 1.9 - 3.5 g/dL    A-G Ratio 1.6 g/dL   Troponin STAT   Result Value Ref Range    Troponin I 9.47 (H) 0.00 - 0.04 ng/mL   Magnesium   Result Value Ref Range    Magnesium 2.2 1.5 - 2.5 mg/dL   Phosphorus   Result Value Ref Range    Phosphorus 2.4 (L) 2.5 - 4.5 mg/dL   PT/INR   Result Value Ref Range    PT 12.7 12.0 - 14.6 sec    INR 0.98 0.87 - 1.13   APTT   Result Value Ref Range    APTT 24.2 (L) 24.7 - 36.0 sec   BNP   Result Value Ref Range    B Natriuretic Peptide 191 (H) 0 - 100 pg/mL   Lipase   Result Value Ref Range    Lipase 17 11 - 82 U/L   LIPID PROFILE   Result Value Ref Range    Cholesterol,Tot 167 100 - 199 mg/dL    Triglycerides 115 0 - 149 mg/dL    HDL 51 >=40 mg/dL    LDL 93 <100 mg/dL   ESTIMATED GFR   Result Value Ref Range    GFR If African American >60 >60 mL/min/1.73 m 2    GFR If Non African American >60 >60 mL/min/1.73 m 2   EKG (ER)   Result Value Ref Range    Report       [facility] Emergency Dept.    Test Date:  2018-02-27  Pt Name:    LB RESENDEZ                   Department: ER  MRN:        [id number]                      Room:       T620  Gender:     Male                          Technician: [id number]  :        1960                   Requested By:JO PARIS  Order #:    [id number]                    Reading MD: JO PARIS DO    Measurements  Intervals                                Axis  Rate:       105                          P:          46  UT:         124                          QRS:        4  QRSD:       82                           T:          123  QT:         332  QTc:        439    Interpretive Statements  SINUS TACHYCARDIA  PROBABLE LEFT ATRIAL ABNORMALITY  ANTERIOR INFARCT, POSSIBLY ACUTE  No previous ECG available for comparison    Electronically Signed On 2018 14:48:35 PST by JO PARIS DO     EKG STAT   Result Value Ref Range    Report       [facility]    Test Date:  2018  Pt Name:    LB RESENDEZ                   Department: ER  MRN:        [id number]                      Room:       [facility]  Gender:     Male                         Technician: DLH  :        1960                   Requested By:ABIMAEL CAMPBELL  Order #:    [id number]                    Reading MD:    Measurements  Intervals                                Axis  Rate:       87                           P:          64  UT:         136                          QRS:        56  QRSD:       70                           T:          114  QT:         396  QTc:        477    Interpretive Statements  SINUS RHYTHM  ANTERIOR INFARCT, POSSIBLY ACUTE  LATERAL LEADS ARE ALSO INVOLVED  BORDERLINE PROLONGED QT INTERVAL  Compared to ECG 2018 08:35:36  Sinus tachycardia no longer present  Myocardial infarct finding still present     All labs reviewed by me.    RADIOLOGY  DX-CHEST-LIMITED (1 VIEW)   Final Result      No evidence of acute cardiopulmonary process.      The radiologist's interpretation of all radiological studies have been reviewed by me.    COURSE & MEDICAL DECISION MAKING  Pertinent Labs & Imaging studies reviewed. (See chart for details)    8:36 AM -  Patient seen and examined at bedside. Dr. Lewis (cardiology) at bedside on patient arrival. He alla be transported to cath lab. I will admit the patient to the hosptialist. Ordered DX chest, BNP, Lipase, CBC with differential, CMP, Troponin, Magnesium, Phosphorus, PT/INR, APTT to evaluate his symptoms.     8:43 AM - I discussed the patient's case and the above findings with Dr. Guerrero (hospitalist) who agrees to admit the patient.     This is a charming 57 y.o. male that presents with an ST elevation myocardial infarction the anterior left ventricle. The patient initially was evaluated in the trauma bay by myself as well as cardiology. The patient was deemed to be experiencing ST elevation myocardial infarction. He does have an elevated troponin, she received aspirin prior to arrival as well as nitroglycerin. The patient is hemodynamically stable and will be taken straight to the and is read for further evaluation and management.     DISPOSITION:  Patient will be admitted to ICU in critical condition.    CRITICAL CARE  The very real possibilty of a deterioration of this patient's condition required the highest level of my preparedness for sudden, emergent intervention.  I provided critical care services, which included medication orders, frequent reevaluations of the patient's condition and response to treatment, ordering and reviewing test results, and discussing the case with various consultants.  The critical care time associated with the care of the patient was 35 minutes. Review chart for interventions. This time is exclusive of any other billable procedures.     FINAL IMPRESSION  ST elevation myocardial infarction  Critical care 35 minutes as above.      Christian BISHOP (Derick), am scribing for, and in the presence of, Jerson Tyler D.O    Electronically signed by: Christian Richards (Derick), 2/27/2018    Jerson BISHOP D.O. personally performed the services described in this  documentation, as scribed by Christian Richards in my presence, and it is both accurate and complete.    The note accurately reflects work and decisions made by me.  Jerson Tyler  2/27/2018  2:50 PM

## 2018-02-27 NOTE — PROGRESS NOTES
Patient belongings at bedside include clothes, cell phone, and wallet. Security notified to remove ciggarettes from personal belongings bag.

## 2018-02-27 NOTE — ASSESSMENT & PLAN NOTE
S/p cath 2/27 and GRISELDA to LAD lesion  High dose statin  DAPT: ASA and Prasugrel  ACEI  BB  Aldactone  Card's following  Pt education   Encourage tobacco cesation

## 2018-02-27 NOTE — PROGRESS NOTES
"Pt to floor and settled after trip to bathroom to urinate. Pt states pain is 6-7/10. \"its the same. It goes up and down. It sometimes changes when I move. It's achy.\" denies radiation.    "

## 2018-02-27 NOTE — H&P
Hospital Medicine History and Physical    Date of Service  2/27/2018    Chief Complaint  Chest pain    History of Presenting Illness  57 y.o. male who presented 2/27/2018 with pmh of alcohol use and smoker is coming today due chest pain, patient stated that had chest pain for several days now but yesterday at 5pm got worse he was not able to sleep and he decided to call 911, pain is severe, no radiated, no increasing of decreasing factors he took tylenol w/o help patient had lightheadedness with chest pain, he was brought to ER where he was found to have STEMI he was taken to cath lab as per nurse in ICU LAD was stented patient's pain is improved although he still has some chest pain, he is c/o right wrist pain from cath site, patient is alert and oriented follows commands and able to give good history, patient drinks at least 9 beers daily denies h/o alcohol withdrawal, no seizures due to alcohol, he is smoker, no h/o DM but his bg is elevated, no fever or chills, no cough, he has mild wheezing, he is on RA, patient denies any N/V/D/C, no focal weakness.     Primary Care Physician  No primary care provider on file.    Consultants  cardiologist    Code Status  Full code    Review of Systems  Review of Systems   Constitutional: Negative for chills and fever.   HENT: Negative for hearing loss and tinnitus.    Eyes: Negative for blurred vision and double vision.   Respiratory: Negative for cough and hemoptysis.    Cardiovascular: Positive for chest pain (improved. ). Negative for PND.   Gastrointestinal: Negative for heartburn and melena.   Genitourinary: Negative for dysuria and urgency.   Musculoskeletal: Negative for myalgias and neck pain.   Skin: Negative for rash.   Neurological: Negative for dizziness and headaches.   Endo/Heme/Allergies: Does not bruise/bleed easily.   Psychiatric/Behavioral: Negative for depression.          Past Medical History  HTN.    Surgical History  No recent  surgeries    Medications  Blood pressure meds does not recall name.     Family History  He does not know he was Adopted.     Social History  Social History   Substance Use Topics   • Smoking status: Not on file   • Smokeless tobacco: Not on file   • Alcohol use Not on file       Allergies  Allergies not on file     Physical Exam  Laboratory   Hemodynamics  No data recorded.      Pulse  Av  Min: 80  Max: 91    NIBP: 137/102      Respiratory      Pulse Oximetry: 96 %             Physical Exam   Constitutional: He is oriented to person, place, and time. He appears well-developed. He appears distressed.   HENT:   Head: Normocephalic.   Mouth/Throat: No oropharyngeal exudate.   Eyes: Conjunctivae are normal. No scleral icterus.   Neck: Normal range of motion. Neck supple. No JVD present.   Cardiovascular: Normal rate, regular rhythm and normal heart sounds.    Pulmonary/Chest: Effort normal and breath sounds normal. No stridor. No respiratory distress. He has no wheezes.   Abdominal: Soft. Bowel sounds are normal. He exhibits no distension. There is no tenderness.   Musculoskeletal: Normal range of motion. He exhibits no tenderness.   Lymphadenopathy:     He has no cervical adenopathy.   Neurological: He is alert and oriented to person, place, and time. He exhibits normal muscle tone.   Skin: No erythema.   Psychiatric: He has a normal mood and affect.   Nursing note and vitals reviewed.      Recent Labs      18   0837   WBC  15.7*   RBC  5.09   HEMOGLOBIN  17.2   HEMATOCRIT  47.8   MCV  93.9   MCH  33.8*   MCHC  36.0*   RDW  40.1   PLATELETCT  234   MPV  9.5     Recent Labs      18   0837   SODIUM  130*   POTASSIUM  4.4   CHLORIDE  95*   CO2  22   GLUCOSE  206*   BUN  8   CREATININE  0.72   CALCIUM  9.3     Recent Labs      18   0837   ALTSGPT  40   ASTSGOT  73*   ALKPHOSPHAT  94   TBILIRUBIN  0.8   LIPASE  17   GLUCOSE  206*     Recent Labs      18   0837   APTT  24.2*   INR  0.98          Recent Labs      02/27/18   0837   TRIGLYCERIDE  115   HDL  51   LDL  93     Lab Results   Component Value Date    TROPONINI 9.47 (H) 02/27/2018       Imaging  cxr no infiltrate or edema.    Assessment/Plan     I anticipate this patient will require at least two midnights for appropriate medical management, necessitating inpatient admission.    * Acute ST elevation myocardial infarction (STEMI) of anterior wall (CMS-[facility])- (present on admission)   Assessment & Plan    Patient's ekg showed STEMI patient was taken to cardiac cath from ER, got stent to LAD, on angiomax and prasugrel, f/u lipid panel. Will probably need echo.         Hyponatremia- (present on admission)   Assessment & Plan    Has low chlorine could be due to dehydration vs alcohol use will keep monitoring.          Leukocytosis- (present on admission)   Assessment & Plan    Probably reactive to STEMI will f/u  No signs of infection.         Hyperglycemia- (present on admission)   Assessment & Plan    No h/o DM, bg >200 probably reactive will check a1c, continue SSI for now.         Smoker- (present on admission)   Assessment & Plan    Smokes 10 cigarettes daily, he was advised to quit smoking and counseled to quit for 3 min. Nicotine patch.         Alcohol use- (present on admission)   Assessment & Plan    Patient drinks at least 9 beers daily, no h/o withdrawal in the past, will start ciwa protocol.             VTE prophylaxis: scd's.

## 2018-02-27 NOTE — PROGRESS NOTES
"Pt to floor and settled in bed after trip to bathroom. Pt states his pain is 6-7/10. States \"it's the same. It goes up and down. Sometimes changes when I reposition myself.\" describes pain as aching. Denies radiation.   "

## 2018-02-28 LAB
ANION GAP SERPL CALC-SCNC: 9 MMOL/L (ref 0–11.9)
BUN SERPL-MCNC: 10 MG/DL (ref 8–22)
CALCIUM SERPL-MCNC: 8 MG/DL (ref 8.5–10.5)
CHLORIDE SERPL-SCNC: 103 MMOL/L (ref 96–112)
CO2 SERPL-SCNC: 21 MMOL/L (ref 20–33)
CREAT SERPL-MCNC: 0.59 MG/DL (ref 0.5–1.4)
EKG IMPRESSION: NORMAL
EKG IMPRESSION: NORMAL
ERYTHROCYTE [DISTWIDTH] IN BLOOD BY AUTOMATED COUNT: 40.2 FL (ref 35.9–50)
GLUCOSE BLD-MCNC: 195 MG/DL (ref 65–99)
GLUCOSE BLD-MCNC: 318 MG/DL (ref 65–99)
GLUCOSE SERPL-MCNC: 209 MG/DL (ref 65–99)
HCT VFR BLD AUTO: 43.3 % (ref 42–52)
HGB BLD-MCNC: 15.2 G/DL (ref 14–18)
LV EJECT FRACT  99904: 55
LV EJECT FRACT MOD 2C 99903: 53.36
LV EJECT FRACT MOD 4C 99902: 60.13
LV EJECT FRACT MOD BP 99901: 58.31
MAGNESIUM SERPL-MCNC: 1.9 MG/DL (ref 1.5–2.5)
MCH RBC QN AUTO: 33.2 PG (ref 27–33)
MCHC RBC AUTO-ENTMCNC: 35.1 G/DL (ref 33.7–35.3)
MCV RBC AUTO: 94.5 FL (ref 81.4–97.8)
PHOSPHATE SERPL-MCNC: 3 MG/DL (ref 2.5–4.5)
PLATELET # BLD AUTO: 209 K/UL (ref 164–446)
PMV BLD AUTO: 9.9 FL (ref 9–12.9)
POTASSIUM SERPL-SCNC: 4.2 MMOL/L (ref 3.6–5.5)
RBC # BLD AUTO: 4.58 M/UL (ref 4.7–6.1)
SODIUM SERPL-SCNC: 133 MMOL/L (ref 135–145)
TROPONIN I SERPL-MCNC: 18.3 NG/ML (ref 0–0.04)
WBC # BLD AUTO: 9.1 K/UL (ref 4.8–10.8)

## 2018-02-28 PROCEDURE — 700102 HCHG RX REV CODE 250 W/ 637 OVERRIDE(OP): Performed by: HOSPITALIST

## 2018-02-28 PROCEDURE — 93306 TTE W/DOPPLER COMPLETE: CPT

## 2018-02-28 PROCEDURE — 700102 HCHG RX REV CODE 250 W/ 637 OVERRIDE(OP): Performed by: INTERNAL MEDICINE

## 2018-02-28 PROCEDURE — G8978 MOBILITY CURRENT STATUS: HCPCS | Mod: CI

## 2018-02-28 PROCEDURE — A9270 NON-COVERED ITEM OR SERVICE: HCPCS | Performed by: INTERNAL MEDICINE

## 2018-02-28 PROCEDURE — 82962 GLUCOSE BLOOD TEST: CPT | Mod: 91

## 2018-02-28 PROCEDURE — 93010 ELECTROCARDIOGRAM REPORT: CPT | Mod: 77 | Performed by: INTERNAL MEDICINE

## 2018-02-28 PROCEDURE — 93306 TTE W/DOPPLER COMPLETE: CPT | Mod: 26 | Performed by: INTERNAL MEDICINE

## 2018-02-28 PROCEDURE — 85027 COMPLETE CBC AUTOMATED: CPT

## 2018-02-28 PROCEDURE — 83735 ASSAY OF MAGNESIUM: CPT

## 2018-02-28 PROCEDURE — A9270 NON-COVERED ITEM OR SERVICE: HCPCS | Performed by: HOSPITALIST

## 2018-02-28 PROCEDURE — 93005 ELECTROCARDIOGRAM TRACING: CPT | Performed by: INTERNAL MEDICINE

## 2018-02-28 PROCEDURE — 97161 PT EVAL LOW COMPLEX 20 MIN: CPT

## 2018-02-28 PROCEDURE — G8980 MOBILITY D/C STATUS: HCPCS | Mod: CI

## 2018-02-28 PROCEDURE — 93010 ELECTROCARDIOGRAM REPORT: CPT | Performed by: INTERNAL MEDICINE

## 2018-02-28 PROCEDURE — 84100 ASSAY OF PHOSPHORUS: CPT

## 2018-02-28 PROCEDURE — 770020 HCHG ROOM/CARE - TELE (206)

## 2018-02-28 PROCEDURE — 84484 ASSAY OF TROPONIN QUANT: CPT

## 2018-02-28 PROCEDURE — 80048 BASIC METABOLIC PNL TOTAL CA: CPT

## 2018-02-28 PROCEDURE — G8979 MOBILITY GOAL STATUS: HCPCS | Mod: CI

## 2018-02-28 PROCEDURE — 99233 SBSQ HOSP IP/OBS HIGH 50: CPT | Performed by: HOSPITALIST

## 2018-02-28 RX ORDER — SPIRONOLACTONE 25 MG/1
25 TABLET ORAL
Status: DISCONTINUED | OUTPATIENT
Start: 2018-02-28 | End: 2018-03-02 | Stop reason: HOSPADM

## 2018-02-28 RX ORDER — NICOTINE 21 MG/24HR
21 PATCH, TRANSDERMAL 24 HOURS TRANSDERMAL
Status: DISCONTINUED | OUTPATIENT
Start: 2018-03-01 | End: 2018-03-02 | Stop reason: HOSPADM

## 2018-02-28 RX ADMIN — CARVEDILOL 6.25 MG: 6.25 TABLET, FILM COATED ORAL at 17:45

## 2018-02-28 RX ADMIN — CARVEDILOL 6.25 MG: 6.25 TABLET, FILM COATED ORAL at 08:03

## 2018-02-28 RX ADMIN — METFORMIN HYDROCHLORIDE 500 MG: 500 TABLET, FILM COATED ORAL at 11:42

## 2018-02-28 RX ADMIN — ASPIRIN 81 MG: 81 TABLET, COATED ORAL at 08:03

## 2018-02-28 RX ADMIN — LORAZEPAM 1 MG: 1 TABLET ORAL at 23:41

## 2018-02-28 RX ADMIN — SPIRONOLACTONE 25 MG: 25 TABLET, FILM COATED ORAL at 08:03

## 2018-02-28 RX ADMIN — ACETAMINOPHEN 650 MG: 325 TABLET, FILM COATED ORAL at 16:22

## 2018-02-28 RX ADMIN — INSULIN HUMAN 1 UNITS: 100 INJECTION, SOLUTION PARENTERAL at 07:09

## 2018-02-28 RX ADMIN — THERA TABS 1 TABLET: TAB at 08:03

## 2018-02-28 RX ADMIN — LORAZEPAM 1 MG: 1 TABLET ORAL at 17:45

## 2018-02-28 RX ADMIN — FOLIC ACID 1 MG: 1 TABLET ORAL at 08:03

## 2018-02-28 RX ADMIN — LORAZEPAM 0.5 MG: 1 TABLET ORAL at 05:15

## 2018-02-28 RX ADMIN — METFORMIN HYDROCHLORIDE 500 MG: 500 TABLET, FILM COATED ORAL at 17:45

## 2018-02-28 RX ADMIN — PRASUGREL HYDROCHLORIDE 10 MG: 10 TABLET, FILM COATED ORAL at 08:04

## 2018-02-28 RX ADMIN — NICOTINE 14 MG: 14 PATCH, EXTENDED RELEASE TRANSDERMAL at 05:06

## 2018-02-28 RX ADMIN — ATORVASTATIN CALCIUM 80 MG: 80 TABLET, FILM COATED ORAL at 20:43

## 2018-02-28 RX ADMIN — LISINOPRIL 10 MG: 10 TABLET ORAL at 08:03

## 2018-02-28 RX ADMIN — Medication 100 MG: at 08:03

## 2018-02-28 RX ADMIN — INSULIN HUMAN 4 UNITS: 100 INJECTION, SOLUTION PARENTERAL at 11:46

## 2018-02-28 RX ADMIN — LORAZEPAM 0.5 MG: 1 TABLET ORAL at 14:17

## 2018-02-28 RX ADMIN — INSULIN HUMAN 1 UNITS: 100 INJECTION, SOLUTION PARENTERAL at 20:45

## 2018-02-28 ASSESSMENT — ENCOUNTER SYMPTOMS
EYE DISCHARGE: 0
ABDOMINAL PAIN: 0
DIZZINESS: 0
LOSS OF CONSCIOUSNESS: 0
BACK PAIN: 0
SHORTNESS OF BREATH: 0
DIARRHEA: 0
MYALGIAS: 0
WHEEZING: 0
NAUSEA: 0
PALPITATIONS: 0
VOMITING: 0
BRUISES/BLEEDS EASILY: 0
SPEECH CHANGE: 0
FEVER: 0
EYE PAIN: 0
BLURRED VISION: 0
COUGH: 0
HEMOPTYSIS: 0
CHILLS: 0
NERVOUS/ANXIOUS: 1
HEADACHES: 0

## 2018-02-28 ASSESSMENT — LIFESTYLE VARIABLES
VISUAL DISTURBANCES: NOT PRESENT
TOTAL SCORE: 3
TOTAL SCORE: 2
ORIENTATION AND CLOUDING OF SENSORIUM: ORIENTED AND CAN DO SERIAL ADDITIONS
NAUSEA AND VOMITING: NO NAUSEA AND NO VOMITING
ORIENTATION AND CLOUDING OF SENSORIUM: ORIENTED AND CAN DO SERIAL ADDITIONS
TOTAL SCORE: 6
TREMOR: NO TREMOR
AGITATION: NORMAL ACTIVITY
PAROXYSMAL SWEATS: *
NAUSEA AND VOMITING: NO NAUSEA AND NO VOMITING
AUDITORY DISTURBANCES: NOT PRESENT
TOTAL SCORE: 4
ANXIETY: MILDLY ANXIOUS
AUDITORY DISTURBANCES: NOT PRESENT
AGITATION: NORMAL ACTIVITY
ORIENTATION AND CLOUDING OF SENSORIUM: ORIENTED AND CAN DO SERIAL ADDITIONS
AGITATION: *
HEADACHE, FULLNESS IN HEAD: NOT PRESENT
AUDITORY DISTURBANCES: NOT PRESENT
TOTAL SCORE: 2
VISUAL DISTURBANCES: NOT PRESENT
VISUAL DISTURBANCES: NOT PRESENT
ORIENTATION AND CLOUDING OF SENSORIUM: ORIENTED AND CAN DO SERIAL ADDITIONS
TREMOR: TREMOR NOT VISIBLE BUT CAN BE FELT, FINGERTIP TO FINGERTIP
ORIENTATION AND CLOUDING OF SENSORIUM: ORIENTED AND CAN DO SERIAL ADDITIONS
PAROXYSMAL SWEATS: *
AUDITORY DISTURBANCES: NOT PRESENT
TREMOR: NO TREMOR
TOTAL SCORE: 5
NAUSEA AND VOMITING: NO NAUSEA AND NO VOMITING
HEADACHE, FULLNESS IN HEAD: NOT PRESENT
AGITATION: MODERATELY FIDGETY AND RESTLESS
HEADACHE, FULLNESS IN HEAD: VERY MILD
HEADACHE, FULLNESS IN HEAD: NOT PRESENT
TREMOR: NO TREMOR
TACTILE DISTURBANCES: VERY MILD ITCHING, PINS AND NEEDLES SENSATION, BURNING OR NUMBNESS
ANXIETY: NO ANXIETY (AT EASE)
NAUSEA AND VOMITING: NO NAUSEA AND NO VOMITING
ANXIETY: NO ANXIETY (AT EASE)
ANXIETY: *
TREMOR: NO TREMOR
ORIENTATION AND CLOUDING OF SENSORIUM: ORIENTED AND CAN DO SERIAL ADDITIONS
AGITATION: NORMAL ACTIVITY
VISUAL DISTURBANCES: NOT PRESENT
PAROXYSMAL SWEATS: *
TREMOR: NO TREMOR
TOTAL SCORE: 10
PAROXYSMAL SWEATS: NO SWEAT VISIBLE
VISUAL DISTURBANCES: NOT PRESENT
TREMOR: NO TREMOR
ANXIETY: *
ANXIETY: MILDLY ANXIOUS
ANXIETY: MILDLY ANXIOUS
TOTAL SCORE: 1
VISUAL DISTURBANCES: NOT PRESENT
AGITATION: NORMAL ACTIVITY
VISUAL DISTURBANCES: NOT PRESENT
PAROXYSMAL SWEATS: *
AUDITORY DISTURBANCES: NOT PRESENT
PAROXYSMAL SWEATS: NO SWEAT VISIBLE
NAUSEA AND VOMITING: NO NAUSEA AND NO VOMITING
NAUSEA AND VOMITING: NO NAUSEA AND NO VOMITING
ORIENTATION AND CLOUDING OF SENSORIUM: ORIENTED AND CAN DO SERIAL ADDITIONS
PAROXYSMAL SWEATS: NO SWEAT VISIBLE
AUDITORY DISTURBANCES: NOT PRESENT
NAUSEA AND VOMITING: NO NAUSEA AND NO VOMITING
HEADACHE, FULLNESS IN HEAD: NOT PRESENT
PAROXYSMAL SWEATS: NO SWEAT VISIBLE
AUDITORY DISTURBANCES: NOT PRESENT
HEADACHE, FULLNESS IN HEAD: NOT PRESENT
ORIENTATION AND CLOUDING OF SENSORIUM: ORIENTED AND CAN DO SERIAL ADDITIONS
AGITATION: *
VISUAL DISTURBANCES: NOT PRESENT
AGITATION: *
TREMOR: NO TREMOR
NAUSEA AND VOMITING: NO NAUSEA AND NO VOMITING
AUDITORY DISTURBANCES: NOT PRESENT
HEADACHE, FULLNESS IN HEAD: NOT PRESENT
ANXIETY: MILDLY ANXIOUS
HEADACHE, FULLNESS IN HEAD: MILD

## 2018-02-28 ASSESSMENT — PAIN SCALES - GENERAL
PAINLEVEL_OUTOF10: 3
PAINLEVEL_OUTOF10: 0
PAINLEVEL_OUTOF10: 2
PAINLEVEL_OUTOF10: 0
PAINLEVEL_OUTOF10: 0
PAINLEVEL_OUTOF10: 5
PAINLEVEL_OUTOF10: 2
PAINLEVEL_OUTOF10: 0
PAINLEVEL_OUTOF10: 2
PAINLEVEL_OUTOF10: 3
PAINLEVEL_OUTOF10: 0

## 2018-02-28 ASSESSMENT — GAIT ASSESSMENTS
GAIT LEVEL OF ASSIST: SUPERVISED
DISTANCE (FEET): 400

## 2018-02-28 NOTE — THERAPY
"Physical Therapy Evaluation completed.   Bed Mobility:  Supine to Sit: Independent  Transfers: Sit to Stand: Independent  Gait: Level Of Assist: Supervised with No Equipment Needed       Plan of Care: Patient with no further skilled PT needs in the acute ca  Pt presents s/p stemi and stent. Pt was seen for phase I cardiac rehab and was instructed in walking program, walk test, RPE scale and importance of outpatient cardiac rehab. No further inpt PT needs.     See \"Rehab Therapy-Acute\" Patient Summary Report for complete documentation.     "

## 2018-02-28 NOTE — CARE PLAN
Problem: Safety  Goal: Will remain free from injury  Bed alarm in place, call light in reach, distraction via TV and cell phone available at bedside. Close to RN station.    Problem: Bowel/Gastric:  Goal: Normal bowel function is maintained or improved    Intervention: Educate patient and significant other/support system about diet, fluid intake, medications and activity to promote bowel function  BM this AM.

## 2018-02-28 NOTE — CARE PLAN
Problem: Pain Management  Goal: Pain level will decrease to patient's comfort goal  Outcome: PROGRESSING AS EXPECTED  Continue to reassess presence, intensity, and quality of pain in patient. Patient denies pain at this time. No pain medications administered during this shift. Will continue to monitor.

## 2018-02-28 NOTE — CARE PLAN
Problem: Safety  Goal: Will remain free from falls    Intervention: Implement fall precautions  Patient educated to use call light when needing to get up. Patient has appropriately called. Patient has treaded socks on, call light in reach and appropriate lighting. Patient room is near nurse's station. Bed in low position and locked.

## 2018-02-28 NOTE — PROGRESS NOTES
"Patient continues to complain of chest pain. Relieved by morphine at 1218. Patient complains of pain at 1516 and requests pain medication. Tramadol given and patient states that \"it's getting better\".  At 1617 patient states pain is a 3-4 but states \"I want to wait till bedtime to take morphine\" and declines medication. Patient HR in the 90s- low 100s. Blood pressures are 130s over 90s. Sinus Rhythm.   "

## 2018-02-28 NOTE — PROGRESS NOTES
[facility]    Date of Service: 2018    Chief Complaint  57 y.o. male admitted 2018 with CP.  Hx tobacco and ETOH abuse, Pre-diabetes.  On presentation found to be having STEMI.  Taken emergenty to cath where a 100% LAD lesion was stented with a GRISELDA stent    Interval Problem Update  Pt feeling much better.  No CP or pressure, no SOB or diaphoresis.  Feels \"fine\"    Consultants/Specialty  Cardiology    Disposition   OK to Tele        Review of Systems   Constitutional: Negative for chills and fever.   Respiratory: Negative for cough and shortness of breath.    Cardiovascular: Negative for chest pain.   Gastrointestinal: Negative for abdominal pain, diarrhea, nausea and vomiting.   Musculoskeletal: Negative for back pain.   Skin: Negative for rash.   Neurological: Negative for dizziness, loss of consciousness and headaches.      Physical Exam  Laboratory/Imaging   Hemodynamics  Temp (24hrs), Av.2 °C (97.2 °F), Min:36.1 °C (97 °F), Max:36.3 °C (97.4 °F)   Temperature: 36.1 °C (97 °F)  Pulse  Av.3  Min: 80  Max: 110 Heart Rate (Monitored): 84  NIBP: 129/96      Respiratory      Respiration: 20, Pulse Oximetry: 95 %, O2 Daily Delivery Respiratory : Room Air with O2 Available        RUL Breath Sounds: Diminished, RML Breath Sounds: Diminished, RLL Breath Sounds: Diminished, IRAJ Breath Sounds: Diminished, LLL Breath Sounds: Diminished    Fluids    Intake/Output Summary (Last 24 hours) at 18 0623  Last data filed at 18 0600   Gross per 24 hour   Intake              966 ml   Output             2250 ml   Net            -1284 ml       Nutrition  Orders Placed This Encounter   Procedures   • Diet Order     Standing Status:   Standing     Number of Occurrences:   1     Order Specific Question:   Diet:     Answer:   Cardiac [6]     Physical Exam   Constitutional: He is oriented to person, place, and time. He appears well-developed and well-nourished. No distress.   HENT:   Head: " Normocephalic and atraumatic.   Eyes: Conjunctivae are normal.   Neck: No JVD present.   Cardiovascular: Normal rate.  Exam reveals no gallop.    No murmur heard.  Pulmonary/Chest: Effort normal. No stridor. No respiratory distress. He has no wheezes. He has no rales.   Abdominal: Soft. There is no tenderness. There is no rebound and no guarding.   Musculoskeletal: He exhibits no edema.   Neurological: He is oriented to person, place, and time.   Skin: Skin is warm and dry. No rash noted. He is not diaphoretic.   Psychiatric: He has a normal mood and affect. Thought content normal.   Nursing note and vitals reviewed.      Recent Labs      02/27/18   0837  02/28/18   0400   WBC  15.7*  9.1   RBC  5.09  4.58*   HEMOGLOBIN  17.2  15.2   HEMATOCRIT  47.8  43.3   MCV  93.9  94.5   MCH  33.8*  33.2*   MCHC  36.0*  35.1   RDW  40.1  40.2   PLATELETCT  234  209   MPV  9.5  9.9     Recent Labs      02/27/18   0837  02/28/18   0505   SODIUM  130*  133*   POTASSIUM  4.4  4.2   CHLORIDE  95*  103   CO2  22  21   GLUCOSE  206*  209*   BUN  8  10   CREATININE  0.72  0.59   CALCIUM  9.3  8.0*     Recent Labs      02/27/18   0837   APTT  24.2*   INR  0.98     Recent Labs      02/27/18   0837   BNPBTYPENAT  191*     Recent Labs      02/27/18   0837   TRIGLYCERIDE  115   HDL  51   LDL  93          Assessment/Plan     * Acute ST elevation myocardial infarction (STEMI) of anterior wall (CMS-[facility])- (present on admission)   Assessment & Plan    S/p cath 2/27 and GRISELDA to LAD lesion  High dose statin  DAPT: ASA and Prasugrel  ACEI  BB  Aldactone  Card's following  Pt education   Encourage tobacco cesation        Hyponatremia- (present on admission)   Assessment & Plan    Has low chlorine could be due to dehydration vs alcohol use will keep monitoring.          Leukocytosis- (present on admission)   Assessment & Plan    Probably reactive to STEMI will f/u  No signs of infection.         Hyperglycemia- (present on admission)   Assessment &  Plan    Had been Dx'd with pre-diabetes  A1c>8  Diabetic educator consult  Start metformin        Smoker- (present on admission)   Assessment & Plan    Smokes 10 cigarettes daily, he was advised to quit smoking and counseled to quit for 3 min. Nicotine patch.         Alcohol use- (present on admission)   Assessment & Plan    Patient drinks at least 9 beers daily, no h/o withdrawal in the past, will start ciwa protocol.           Quality-Core Measures   Reviewed items::  Radiology images reviewed, EKG reviewed, Labs reviewed and Medications reviewed  DVT prophylaxis pharmacological::  Not indicated at this time, ambulatory  DVT prophylaxis - mechanical:  SCDs  Ulcer Prophylaxis::  Not indicated

## 2018-02-28 NOTE — PROGRESS NOTES
Monitor Summary:    0.12 / 0.08 / 0.40  SR - ST HR 80's - 110's.   Chest pain 2/10 at beginning of shift.  Has denied chest pain at rest and upon exertion throughout rest of shift.   12 Hour Chart Check

## 2018-02-28 NOTE — PROGRESS NOTES
"Pt is irritable and demanding. States \"Aren't you going to take me on a walk now?\" \"I need you to get me coffee right now.\" Teaching provided and boundaries set. Kitchen called to add decaf coffee to breakfast tray and plan to mobilize after morning assessments and medications verbalized. Pt currently in bed with bed alarm on. Charged cell phone at bedside. TV on. Call light in reach.   "

## 2018-02-28 NOTE — CARE PLAN
Problem: Respiratory:  Goal: Respiratory status will improve  Outcome: PROGRESSING AS EXPECTED  Continue to monitor spo2 continuously. Supplemental 02 administered for intermittent decreases in spo2 while sleeping.   Intervention: Administer and titrate oxygen therapy  Con

## 2018-02-28 NOTE — DIETARY
Nutrition Services: Consult cardiac rehab diet education, DM diet education    Pt is a 57 y.o. Male with Dx: STEMI    PMH: HTN (not taking meds x1 year), ETOH, smoker  BMI= 23.26  Labs: HA1c 8.5% (new Dx); lipids WNL    Admit day 1. S/p cardiac cath with stent placement 2/27.  Pt on a cardiac diet, eating % per ADLs.  Attempted diet education, but pt was unavailable. Left handouts outside of room.    RD will F/u for diet education.

## 2018-02-28 NOTE — PROGRESS NOTES
Cardiovascular Nurse Navigator ([phone]) Note:    Reviewed ACS/PCI medications:  Dual Antiplatelet Therapy (DAPT):  aspirin + prasugrel  Beta-Blocker:  carvediolol  Statin:  atorvastatin 80  ACEI/ARB:  lisinopril  Aldosterone blocking agent:   aldactone    Intensive Cardiac Rehab (ICR) Referral:  Referred on 2/27; has current inpatient orders for nutrition consult & PT for Phase I ICR  ICR follow-up and contact info added to AVS:  yes    Cardiology Follow-Up:  Will have ER schedulers arrange      Inpatient & Discharge Patient Education:  Bedside nursing to continually provide patient education on ACS meds, signs and symptoms to monitor for, and risk factor modification. Also at discharge please complete the “ACS” special instructions on the AVS.  Thank you and please call with questions.

## 2018-02-28 NOTE — CARE PLAN
Problem: Pain Management  Goal: Pain level will decrease to patient's comfort goal    Intervention: Follow pain managment plan developed in collaboration with patient and Interdisciplinary Team  Pain assessed in patient q2 hours using 0-10 scale. Patient encouraged to verbalize if pain increased. Patient given morphine and tramadol to relieve pain per orders.

## 2018-02-28 NOTE — DISCHARGE PLANNING
Medical SW    Sw attended AM IDT Rounds.    RN reports, pt will transfer and waiting for bed, on CIWA protocol, order nicotine path    Plan: Sw to assist w/ d/c planning as needed.

## 2018-02-28 NOTE — PROGRESS NOTES
Cardiology Progress Note               Author: Louise Kingsrbarig Date & Time created: 2/28/2018  7:19 AM     Interval History:    Mild CP overnight  Cath showed total occlusion mid LAD, successfully stented  Post PCI EKG ST elevation persisted  Initial Tn was 9, early peaked at 100+ partly early washed out due to PCI, trending down  Was mildly hypertensive initially but lately has been normotensive  Tolerating meds  Monitor reviewed by me showed no significant ventricular or atrial dysrhythmias.      Chief Complaint:  Chest pain    Review of Systems   Constitutional: Negative for fever.   HENT: Negative for congestion.    Eyes: Negative for blurred vision, pain and discharge.   Respiratory: Negative for cough, hemoptysis and wheezing.    Cardiovascular: Positive for chest pain. Negative for palpitations.   Gastrointestinal: Negative for abdominal pain, nausea and vomiting.   Musculoskeletal: Negative for joint pain and myalgias.   Neurological: Negative for speech change and loss of consciousness.   Endo/Heme/Allergies: Does not bruise/bleed easily.   Psychiatric/Behavioral: The patient is nervous/anxious.    All other systems reviewed and are negative.      Physical Exam   Constitutional: He is oriented to person, place, and time. He appears well-developed. No distress.   HENT:   Mouth/Throat: Mucous membranes are normal.   Eyes: Conjunctivae and EOM are normal.   Neck: No JVD present. No tracheal deviation present. No thyroid mass and no thyromegaly present.   Cardiovascular: Normal rate, regular rhythm and intact distal pulses.  Exam reveals no friction rub.    No murmur heard.  Pulmonary/Chest: Effort normal. No respiratory distress. He has decreased breath sounds. He exhibits no tenderness.   Abdominal: Soft. There is no tenderness.   Musculoskeletal: Normal range of motion. He exhibits no edema.   Neurological: He is alert and oriented to person, place, and time. He has normal strength. He displays no  tremor.   Skin: Skin is warm and dry. He is not diaphoretic.   Psychiatric: He has a normal mood and affect. His behavior is normal.   Vitals reviewed.      Hemodynamics:  Temp (24hrs), Av.2 °C (97.2 °F), Min:36.1 °C (97 °F), Max:36.3 °C (97.4 °F)  Temperature: 36.3 °C (97.3 °F)  Pulse  Av.3  Min: 80  Max: 110Heart Rate (Monitored): 84  NIBP: 129/96     Respiratory:    Respiration: 20, Pulse Oximetry: 95 %, O2 Daily Delivery Respiratory : Room Air with O2 Available        RUL Breath Sounds: Diminished, RML Breath Sounds: Diminished, RLL Breath Sounds: Diminished, IRAJ Breath Sounds: Diminished, LLL Breath Sounds: Diminished  Fluids:     Weight: 63.4 kg (139 lb 12.4 oz)  GI/Nutrition:  Orders Placed This Encounter   Procedures   • Diet Order     Standing Status:   Standing     Number of Occurrences:   1     Order Specific Question:   Diet:     Answer:   Cardiac [6]     Lab Results:  Recent Labs      18   0837  18   0400   WBC  15.7*  9.1   RBC  5.09  4.58*   HEMOGLOBIN  17.2  15.2   HEMATOCRIT  47.8  43.3   MCV  93.9  94.5   MCH  33.8*  33.2*   MCHC  36.0*  35.1   RDW  40.1  40.2   PLATELETCT  234  209   MPV  9.5  9.9     Recent Labs      18   0837  18   0505   SODIUM  130*  133*   POTASSIUM  4.4  4.2   CHLORIDE  95*  103   CO2  22  21   GLUCOSE  206*  209*   BUN  8  10   CREATININE  0.72  0.59   CALCIUM  9.3  8.0*     Recent Labs      18   0837   APTT  24.2*   INR  0.98     Recent Labs      18   0837   BNPBTYPENAT  191*     Recent Labs      18   0837  18   1405  18   1754   TROPONINI  9.47*  106.01*  55.58*   BNPBTYPENAT  191*   --    --      Recent Labs      18   0837   TRIGLYCERIDE  115   HDL  51   LDL  93         Medical Decision Making, by Problem:  Active Hospital Problems    Diagnosis   • *Acute ST elevation myocardial infarction (STEMI) of anterior wall (CMS-HCC) [I21.09] late presenting, s/p LAD stent but with persistent ST elevation,  relatively large infarct but stable   • Alcohol use [Z78.9]   • Smoker [F17.200]   • Hyperglycemia [R73.9]   • Leukocytosis [D72.829]   • Hyponatremia [E87.1] improved   Hypertension controlled    Plan:  Continue DAPT, statin, carvedilol, ACEI and spironolactone  Check ECHO tomorrow  May transfer to tele    Quality-Core Measures

## 2018-03-01 LAB
GLUCOSE BLD-MCNC: 133 MG/DL (ref 65–99)
GLUCOSE BLD-MCNC: 147 MG/DL (ref 65–99)
GLUCOSE BLD-MCNC: 158 MG/DL (ref 65–99)
GLUCOSE BLD-MCNC: 189 MG/DL (ref 65–99)
GLUCOSE BLD-MCNC: 201 MG/DL (ref 65–99)
GLUCOSE BLD-MCNC: 226 MG/DL (ref 65–99)
MAGNESIUM SERPL-MCNC: 2.1 MG/DL (ref 1.5–2.5)
PHOSPHATE SERPL-MCNC: 3.5 MG/DL (ref 2.5–4.5)

## 2018-03-01 PROCEDURE — 99232 SBSQ HOSP IP/OBS MODERATE 35: CPT | Performed by: HOSPITALIST

## 2018-03-01 PROCEDURE — A9270 NON-COVERED ITEM OR SERVICE: HCPCS | Performed by: INTERNAL MEDICINE

## 2018-03-01 PROCEDURE — 700102 HCHG RX REV CODE 250 W/ 637 OVERRIDE(OP): Performed by: INTERNAL MEDICINE

## 2018-03-01 PROCEDURE — 84100 ASSAY OF PHOSPHORUS: CPT

## 2018-03-01 PROCEDURE — A9270 NON-COVERED ITEM OR SERVICE: HCPCS | Performed by: HOSPITALIST

## 2018-03-01 PROCEDURE — 700102 HCHG RX REV CODE 250 W/ 637 OVERRIDE(OP): Performed by: HOSPITALIST

## 2018-03-01 PROCEDURE — 82962 GLUCOSE BLOOD TEST: CPT | Mod: 91

## 2018-03-01 PROCEDURE — 83735 ASSAY OF MAGNESIUM: CPT

## 2018-03-01 PROCEDURE — A9270 NON-COVERED ITEM OR SERVICE: HCPCS

## 2018-03-01 PROCEDURE — 700102 HCHG RX REV CODE 250 W/ 637 OVERRIDE(OP)

## 2018-03-01 PROCEDURE — 770020 HCHG ROOM/CARE - TELE (206)

## 2018-03-01 RX ORDER — LORAZEPAM 1 MG/1
2 TABLET ORAL EVERY 4 HOURS PRN
Status: DISCONTINUED | OUTPATIENT
Start: 2018-03-01 | End: 2018-03-02 | Stop reason: HOSPADM

## 2018-03-01 RX ORDER — LORAZEPAM 1 MG/1
1 TABLET ORAL EVERY 4 HOURS PRN
Status: DISCONTINUED | OUTPATIENT
Start: 2018-03-01 | End: 2018-03-02 | Stop reason: HOSPADM

## 2018-03-01 RX ORDER — LORAZEPAM 1 MG/1
1-2 TABLET ORAL EVERY 4 HOURS PRN
Status: DISCONTINUED | OUTPATIENT
Start: 2018-03-01 | End: 2018-03-01

## 2018-03-01 RX ADMIN — STANDARDIZED SENNA CONCENTRATE AND DOCUSATE SODIUM 2 TABLET: 8.6; 5 TABLET, FILM COATED ORAL at 08:05

## 2018-03-01 RX ADMIN — FOLIC ACID 1 MG: 1 TABLET ORAL at 08:04

## 2018-03-01 RX ADMIN — NICOTINE 21 MG: 21 PATCH, EXTENDED RELEASE TRANSDERMAL at 05:36

## 2018-03-01 RX ADMIN — LORAZEPAM 1 MG: 1 TABLET ORAL at 20:54

## 2018-03-01 RX ADMIN — INSULIN HUMAN 2 UNITS: 100 INJECTION, SOLUTION PARENTERAL at 05:41

## 2018-03-01 RX ADMIN — LORAZEPAM 1 MG: 1 TABLET ORAL at 12:04

## 2018-03-01 RX ADMIN — INSULIN HUMAN 1 UNITS: 100 INJECTION, SOLUTION PARENTERAL at 12:03

## 2018-03-01 RX ADMIN — INSULIN HUMAN 2 UNITS: 100 INJECTION, SOLUTION PARENTERAL at 20:50

## 2018-03-01 RX ADMIN — ASPIRIN 81 MG: 81 TABLET, COATED ORAL at 08:04

## 2018-03-01 RX ADMIN — ACETAMINOPHEN 650 MG: 325 TABLET, FILM COATED ORAL at 14:59

## 2018-03-01 RX ADMIN — METFORMIN HYDROCHLORIDE 500 MG: 500 TABLET, FILM COATED ORAL at 08:05

## 2018-03-01 RX ADMIN — PRASUGREL HYDROCHLORIDE 10 MG: 10 TABLET, FILM COATED ORAL at 08:04

## 2018-03-01 RX ADMIN — THERA TABS 1 TABLET: TAB at 08:05

## 2018-03-01 RX ADMIN — ATORVASTATIN CALCIUM 80 MG: 80 TABLET, FILM COATED ORAL at 20:47

## 2018-03-01 RX ADMIN — CARVEDILOL 6.25 MG: 6.25 TABLET, FILM COATED ORAL at 18:07

## 2018-03-01 RX ADMIN — CARVEDILOL 6.25 MG: 6.25 TABLET, FILM COATED ORAL at 08:05

## 2018-03-01 RX ADMIN — Medication 100 MG: at 08:05

## 2018-03-01 RX ADMIN — SPIRONOLACTONE 25 MG: 25 TABLET, FILM COATED ORAL at 08:05

## 2018-03-01 RX ADMIN — METFORMIN HYDROCHLORIDE 850 MG: 850 TABLET, FILM COATED ORAL at 18:07

## 2018-03-01 RX ADMIN — LISINOPRIL 10 MG: 10 TABLET ORAL at 08:05

## 2018-03-01 ASSESSMENT — LIFESTYLE VARIABLES
TREMOR: NO TREMOR
AGITATION: NORMAL ACTIVITY
ANXIETY: MILDLY ANXIOUS
PAROXYSMAL SWEATS: NO SWEAT VISIBLE
AUDITORY DISTURBANCES: NOT PRESENT
AUDITORY DISTURBANCES: NOT PRESENT
ORIENTATION AND CLOUDING OF SENSORIUM: ORIENTED AND CAN DO SERIAL ADDITIONS
ANXIETY: MILDLY ANXIOUS
HEADACHE, FULLNESS IN HEAD: NOT PRESENT
VISUAL DISTURBANCES: NOT PRESENT
ANXIETY: MILDLY ANXIOUS
NAUSEA AND VOMITING: NO NAUSEA AND NO VOMITING
AUDITORY DISTURBANCES: NOT PRESENT
ORIENTATION AND CLOUDING OF SENSORIUM: ORIENTED AND CAN DO SERIAL ADDITIONS
NAUSEA AND VOMITING: NO NAUSEA AND NO VOMITING
ANXIETY: MILDLY ANXIOUS
TREMOR: NO TREMOR
PAROXYSMAL SWEATS: NO SWEAT VISIBLE
TOTAL SCORE: 1
VISUAL DISTURBANCES: NOT PRESENT
VISUAL DISTURBANCES: NOT PRESENT
PAROXYSMAL SWEATS: NO SWEAT VISIBLE
HEADACHE, FULLNESS IN HEAD: NOT PRESENT
NAUSEA AND VOMITING: NO NAUSEA AND NO VOMITING
AGITATION: NORMAL ACTIVITY
TOTAL SCORE: 1
AGITATION: NORMAL ACTIVITY
NAUSEA AND VOMITING: NO NAUSEA AND NO VOMITING
AUDITORY DISTURBANCES: NOT PRESENT
ORIENTATION AND CLOUDING OF SENSORIUM: ORIENTED AND CAN DO SERIAL ADDITIONS
ANXIETY: MILDLY ANXIOUS
HEADACHE, FULLNESS IN HEAD: NOT PRESENT
TOTAL SCORE: 1
ORIENTATION AND CLOUDING OF SENSORIUM: ORIENTED AND CAN DO SERIAL ADDITIONS
NAUSEA AND VOMITING: NO NAUSEA AND NO VOMITING
HEADACHE, FULLNESS IN HEAD: NOT PRESENT
ANXIETY: MODERATELY ANXIOUS OR GUARDED, SO ANXIETY IS INFERRED
VISUAL DISTURBANCES: NOT PRESENT
AGITATION: NORMAL ACTIVITY
AGITATION: *
ORIENTATION AND CLOUDING OF SENSORIUM: ORIENTED AND CAN DO SERIAL ADDITIONS
TREMOR: NO TREMOR
TREMOR: NO TREMOR
TOTAL SCORE: 8
NAUSEA AND VOMITING: NO NAUSEA AND NO VOMITING
TOTAL SCORE: 2
VISUAL DISTURBANCES: NOT PRESENT
PAROXYSMAL SWEATS: *
PAROXYSMAL SWEATS: NO SWEAT VISIBLE
AUDITORY DISTURBANCES: NOT PRESENT
HEADACHE, FULLNESS IN HEAD: NOT PRESENT
ORIENTATION AND CLOUDING OF SENSORIUM: ORIENTED AND CAN DO SERIAL ADDITIONS
TACTILE DISTURBANCES: VERY MILD ITCHING, PINS AND NEEDLES SENSATION, BURNING OR NUMBNESS
TREMOR: NO TREMOR
HEADACHE, FULLNESS IN HEAD: NOT PRESENT
TOTAL SCORE: 1
AUDITORY DISTURBANCES: NOT PRESENT
VISUAL DISTURBANCES: NOT PRESENT
PAROXYSMAL SWEATS: NO SWEAT VISIBLE
AGITATION: NORMAL ACTIVITY
TREMOR: NO TREMOR

## 2018-03-01 ASSESSMENT — PAIN SCALES - GENERAL
PAINLEVEL_OUTOF10: 0
PAINLEVEL_OUTOF10: 0
PAINLEVEL_OUTOF10: 4
PAINLEVEL_OUTOF10: 0

## 2018-03-01 ASSESSMENT — ENCOUNTER SYMPTOMS
DIZZINESS: 0
SHORTNESS OF BREATH: 0
NERVOUS/ANXIOUS: 0
DEPRESSION: 0
BACK PAIN: 0
DIARRHEA: 0
EYE DISCHARGE: 0
EYE PAIN: 0
MYALGIAS: 0
PALPITATIONS: 0
WHEEZING: 0
FEVER: 0
BLURRED VISION: 0
NAUSEA: 0
WEAKNESS: 1
HEADACHES: 0
BRUISES/BLEEDS EASILY: 0
SPEECH CHANGE: 0
COUGH: 0
CHILLS: 0
LOSS OF CONSCIOUSNESS: 0
ABDOMINAL PAIN: 0
HEMOPTYSIS: 0
VOMITING: 0

## 2018-03-01 ASSESSMENT — COPD QUESTIONNAIRES
DURING THE PAST 4 WEEKS HOW MUCH DID YOU FEEL SHORT OF BREATH: NONE/LITTLE OF THE TIME
HAVE YOU SMOKED AT LEAST 100 CIGARETTES IN YOUR ENTIRE LIFE: YES
COPD SCREENING SCORE: 3
DO YOU EVER COUGH UP ANY MUCUS OR PHLEGM?: NO/ONLY WITH OCCASIONAL COLDS OR INFECTIONS

## 2018-03-01 NOTE — PROGRESS NOTES
"[facility]    Date of Service: 3/1/2018    Chief Complaint  57 y.o. male admitted 2018 with CP.  Hx tobacco and ETOH abuse, Pre-diabetes.  On presentation found to be having STEMI.  Taken emergenty to cath where a 100% LAD lesion was stented with a GRISELDA stent    Interval Problem Update  2 episodes overnight of anxiety, SOB and tachycardia.    This am reports \"tight, can't breathe well\"  Ordered RT with Xopenex and on repeat exam sx's resolved    Consultants/Specialty  Cardiology    Disposition   OK to Tele    Check EKG and Trop to make sure this is not a CP equivalent  Dc albuterol in favour of xopenex      Review of Systems   Constitutional: Negative for chills and fever.   Respiratory: Negative for cough and shortness of breath.    Cardiovascular: Negative for chest pain.   Gastrointestinal: Negative for abdominal pain, diarrhea, nausea and vomiting.   Musculoskeletal: Negative for back pain.   Skin: Negative for rash.   Neurological: Positive for weakness. Negative for dizziness, loss of consciousness and headaches.      Physical Exam  Laboratory/Imaging   Hemodynamics  Temp (24hrs), Av.5 °C (97.7 °F), Min:36.4 °C (97.5 °F), Max:36.8 °C (98.2 °F)   Temperature: 36.5 °C (97.7 °F)  Pulse  Av.5  Min: 80  Max: 110 Heart Rate (Monitored): 100  NIBP: 122/47      Respiratory      Respiration: 16, Pulse Oximetry: 96 %        RUL Breath Sounds: Clear, RML Breath Sounds: Clear, RLL Breath Sounds: Diminished, IRAJ Breath Sounds: Clear, LLL Breath Sounds: Diminished    Fluids    Intake/Output Summary (Last 24 hours) at 18 0548  Last data filed at 18 0400   Gross per 24 hour   Intake             1480 ml   Output              900 ml   Net              580 ml       Nutrition  Orders Placed This Encounter   Procedures   • Diet Order     Standing Status:   Standing     Number of Occurrences:   1     Order Specific Question:   Diet:     Answer:   Cardiac [6]     Physical Exam "   Constitutional: He is oriented to person, place, and time. He appears well-developed and well-nourished. No distress.   HENT:   Head: Normocephalic and atraumatic.   Eyes: Conjunctivae are normal.   Neck: No JVD present.   Cardiovascular: Normal rate.  Exam reveals no gallop.    No murmur heard.  Pulmonary/Chest: Effort normal. No stridor. No respiratory distress. He has no wheezes. He has no rales.   Abdominal: Soft. There is no tenderness. There is no rebound and no guarding.   Musculoskeletal: He exhibits no edema.   Neurological: He is oriented to person, place, and time.   Skin: Skin is warm and dry. No rash noted. He is not diaphoretic.   Psychiatric: He has a normal mood and affect. Thought content normal.   Nursing note and vitals reviewed.      Recent Labs      02/27/18   0837  02/28/18   0400   WBC  15.7*  9.1   RBC  5.09  4.58*   HEMOGLOBIN  17.2  15.2   HEMATOCRIT  47.8  43.3   MCV  93.9  94.5   MCH  33.8*  33.2*   MCHC  36.0*  35.1   RDW  40.1  40.2   PLATELETCT  234  209   MPV  9.5  9.9     Recent Labs      02/27/18   0837  02/28/18   0505   SODIUM  130*  133*   POTASSIUM  4.4  4.2   CHLORIDE  95*  103   CO2  22  21   GLUCOSE  206*  209*   BUN  8  10   CREATININE  0.72  0.59   CALCIUM  9.3  8.0*     Recent Labs      02/27/18   0837   APTT  24.2*   INR  0.98     Recent Labs      02/27/18   0837   BNPBTYPENAT  191*     Recent Labs      02/27/18   0837   TRIGLYCERIDE  115   HDL  51   LDL  93          Assessment/Plan     * Acute ST elevation myocardial infarction (STEMI) of anterior wall (CMS-HCC)- (present on admission)   Assessment & Plan    S/p cath 2/27 and GRISELDA to LAD lesion  High dose statin  DAPT: ASA and Prasugrel  ACEI  BB  Aldactone  Card's following  Pt education   Encourage tobacco cesation        Hyponatremia- (present on admission)   Assessment & Plan    Has low chlorine could be due to dehydration vs alcohol use will keep monitoring.          Leukocytosis- (present on admission)    Assessment & Plan    Probably reactive to STEMI will f/u  No signs of infection.         Hyperglycemia- (present on admission)   Assessment & Plan    Had been Dx'd with pre-diabetes  A1c>8  Diabetic educator consult  Increase metformin  Cont SSI        Smoker- (present on admission)   Assessment & Plan    Smokes 10 cigarettes daily, he was advised to quit smoking and counseled to quit for 3 min. Nicotine patch.         Alcohol use- (present on admission)   Assessment & Plan    Patient drinks at least 9 beers daily, no h/o withdrawal in the past, will start ciwa protocol.           Quality-Core Measures   Reviewed items::  Radiology images reviewed, EKG reviewed, Labs reviewed and Medications reviewed  DVT prophylaxis pharmacological::  Not indicated at this time, ambulatory  DVT prophylaxis - mechanical:  SCDs  Ulcer Prophylaxis::  Not indicated

## 2018-03-01 NOTE — PROGRESS NOTES
Monitor Summary;    SR - ST 80's-100's    .14/.10/.42    No complaints of chest pain throughout the night.

## 2018-03-01 NOTE — DISCHARGE PLANNING
Medical SW    Sw attended AM IDT Rounds.    RN reports, pt A/O x4, up and moving around,      Plan: Sw to assist w/ d/c planning as needed.

## 2018-03-01 NOTE — CARE PLAN
Problem: Safety  Goal: Will remain free from injury  Outcome: PROGRESSING AS EXPECTED  Bed in lowest position, call light within reach, patient calls appropriately, treaded socks on.     Problem: Knowledge Deficit  Goal: Knowledge of disease process/condition, treatment plan, diagnostic tests, and medications will improve  Outcome: PROGRESSING AS EXPECTED  POC discussed with patient about stent placement and diabetes. All questions answered at this time.

## 2018-03-01 NOTE — CARE PLAN
Problem: Safety  Goal: Will remain free from injury    Intervention: Provide assistance with mobility  Ambulates by self with tele box in place. Calls when needed. PT visited patient for discharge needs during shift. Steady gait. Denies dizziness or SOB.       Problem: Discharge Barriers/Planning  Goal: Patient's continuum of care needs will be met  Multiple questions related to high sugars asked. DM teaching provided by RN multiple times over shift. Metformin started today. Sliding scale continues.

## 2018-03-01 NOTE — PROGRESS NOTES
Report given to Tele 7 RN. Patient transported by this RN from T-620 to T-737-2. All belongings, chart, and meds with patient.

## 2018-03-01 NOTE — DISCHARGE PLANNING
Medical SW    Referral: Sw spoke to Diabetic Educator RNMadeleine. Pt is anxious and agitated. Pt refused a diabetic meter. He is concerned about how he will get home. He states he has a home on [address] in [facility] ([facility] in [place]. The face sheet indicates his mailing address is  [employer].    Intervention: Pt is a new diabetes dx and will need metformin upon d/c. Pt appears to have Medicare part A only. Pt [facility] to [facility] [place] [facility].    Sw spoke to [facility] [place]. Pt is not connected w/ [facility] to qualify for any d/c facility benefits.     However, he is an [facility]/ the [facility] and can get his medications paid for by the [facility]. He will need to bring his d/c hard copy prescriptions [facility] to either the [facility] ER or his [facility] PCP office to have them rewritten by a VA MD then filled w/ at the [facility] pharmacy.       Plan: Sw to assist w/ d/c planning as needed.

## 2018-03-01 NOTE — PROGRESS NOTES
Cardiology Progress Note               Author: Louise Darnellbaribrenda Date & Time created: 3/1/2018  8:32 AM     Interval History:    The patient is doing well from cardiac standpoint.  He denies any chest pain, palpitation or heart failure type symptoms.  Denies any side effect from medications.  Monitor reviewed by me showed no significant ventricular or atrial dysrhythmias.    ECHO EF 55% with mid-dist ant hypokinesis    Chief Complaint:  CP    Review of Systems   Constitutional: Negative for fever.   HENT: Negative for congestion.    Eyes: Negative for blurred vision, pain and discharge.   Respiratory: Negative for cough, hemoptysis and wheezing.    Cardiovascular: Negative for chest pain and palpitations.   Gastrointestinal: Negative for abdominal pain, nausea and vomiting.   Musculoskeletal: Negative for joint pain and myalgias.   Neurological: Negative for speech change and loss of consciousness.   Endo/Heme/Allergies: Does not bruise/bleed easily.   Psychiatric/Behavioral: Negative for depression. The patient is not nervous/anxious.    All other systems reviewed and are negative.      Physical Exam   Constitutional: He is oriented to person, place, and time. He appears well-developed. No distress.   HENT:   Mouth/Throat: Mucous membranes are normal.   Eyes: Conjunctivae and EOM are normal.   Neck: No JVD present. No tracheal deviation present. No thyroid mass and no thyromegaly present.   Cardiovascular: Normal rate, regular rhythm and intact distal pulses.    No murmur heard.  Pulmonary/Chest: Effort normal and breath sounds normal. No respiratory distress. He exhibits no tenderness.   Abdominal: Soft. There is no tenderness.   Musculoskeletal: Normal range of motion. He exhibits no edema.   Neurological: He is alert and oriented to person, place, and time. He has normal strength. He displays no tremor.   Skin: Skin is warm and dry. He is not diaphoretic.   Psychiatric: He has a normal mood and affect. His  behavior is normal.   Vitals reviewed.      Hemodynamics:  Temp (24hrs), Av.5 °C (97.7 °F), Min:36.4 °C (97.5 °F), Max:36.8 °C (98.2 °F)  Temperature: 36.5 °C (97.7 °F)  Pulse  Av.4  Min: 80  Max: 110Heart Rate (Monitored): 100  NIBP: 122/47     Respiratory:    Respiration: 16, Pulse Oximetry: 96 %, O2 Daily Delivery Respiratory : Room Air with O2 Available        RUL Breath Sounds: Clear, RML Breath Sounds: Clear, RLL Breath Sounds: Diminished, IRAJ Breath Sounds: Clear, LLL Breath Sounds: Diminished  Fluids:     Weight: 63.2 kg (139 lb 5.3 oz)  GI/Nutrition:  Orders Placed This Encounter   Procedures   • Diet Order     Standing Status:   Standing     Number of Occurrences:   1     Order Specific Question:   Diet:     Answer:   Cardiac [6]     Lab Results:  Recent Labs      18   0837  18   0400   WBC  15.7*  9.1   RBC  5.09  4.58*   HEMOGLOBIN  17.2  15.2   HEMATOCRIT  47.8  43.3   MCV  93.9  94.5   MCH  33.8*  33.2*   MCHC  36.0*  35.1   RDW  40.1  40.2   PLATELETCT  234  209   MPV  9.5  9.9     Recent Labs      18   0837  18   0505   SODIUM  130*  133*   POTASSIUM  4.4  4.2   CHLORIDE  95*  103   CO2  22  21   GLUCOSE  206*  209*   BUN  8  10   CREATININE  0.72  0.59   CALCIUM  9.3  8.0*     Recent Labs      18   0837   APTT  24.2*   INR  0.98     Recent Labs      18   0837   BNPBTYPENAT  191*     Recent Labs      18   0837  18   1405  18   1754  18   1725   TROPONINI  9.47*  106.01*  55.58*  18.30*   BNPBTYPENAT  191*   --    --    --      Recent Labs      18   0837   TRIGLYCERIDE  115   HDL  51   LDL  93         Medical Decision Making, by Problem:  Active Hospital Problems    Diagnosis   • *Acute ST elevation myocardial infarction (STEMI) of anterior wall (CMS-HCC) [I21.09] late presenting, s/p LAD stent, doing relatively well   • Alcohol use [Z78.9]   • Smoker [F17.200]   • Hyperglycemia [R73.9]   • Leukocytosis [D72.829]   •  Hyponatremia [E87.1]       Plan:  Ambulate  Cont current cardiac meds  Prob home tomorrow vs later this PM    Quality-Core Measures

## 2018-03-01 NOTE — PROGRESS NOTES
"Pt experiencing sudden chest pain 4-5 mid chest. Denies radiation but states \"it is the same pain as when I came in.\" Stat 12 lead ekg and troponin ordered.   "

## 2018-03-01 NOTE — PROGRESS NOTES
"Pt stated pain resolved immediately post ekg. Asks \"why do I have a cold sweat?\" States  He feels heart palpitations. Denies tremors or sensitivity to senses other than mild headache. No tremors noted. Teaching provided about withdrawal and ruling out another STEMI vs anxiety r/t withdrawal. Ativan given. Troponin in progress at this time.   "

## 2018-03-02 ENCOUNTER — PATIENT OUTREACH (OUTPATIENT)
Dept: HEALTH INFORMATION MANAGEMENT | Facility: OTHER | Age: 58
End: 2018-03-02

## 2018-03-02 ENCOUNTER — TELEPHONE (OUTPATIENT)
Dept: VASCULAR LAB | Facility: MEDICAL CENTER | Age: 58
End: 2018-03-02

## 2018-03-02 VITALS
HEIGHT: 65 IN | DIASTOLIC BLOOD PRESSURE: 93 MMHG | HEART RATE: 63 BPM | TEMPERATURE: 97.4 F | RESPIRATION RATE: 18 BRPM | OXYGEN SATURATION: 96 % | SYSTOLIC BLOOD PRESSURE: 134 MMHG | BODY MASS INDEX: 25.27 KG/M2 | WEIGHT: 151.68 LBS

## 2018-03-02 PROBLEM — I21.09 ACUTE ST ELEVATION MYOCARDIAL INFARCTION (STEMI) OF ANTERIOR WALL (HCC): Status: RESOLVED | Noted: 2018-02-27 | Resolved: 2018-03-02

## 2018-03-02 PROBLEM — E11.69 DIABETES MELLITUS TYPE 2 IN OBESE: Status: ACTIVE | Noted: 2018-02-27

## 2018-03-02 PROBLEM — E66.9 DIABETES MELLITUS TYPE 2 IN OBESE: Status: ACTIVE | Noted: 2018-02-27

## 2018-03-02 PROBLEM — D72.829 LEUKOCYTOSIS: Status: RESOLVED | Noted: 2018-02-27 | Resolved: 2018-03-02

## 2018-03-02 PROBLEM — E87.1 HYPONATREMIA: Status: RESOLVED | Noted: 2018-02-27 | Resolved: 2018-03-02

## 2018-03-02 LAB
GLUCOSE BLD-MCNC: 200 MG/DL (ref 65–99)
MAGNESIUM SERPL-MCNC: 2.2 MG/DL (ref 1.5–2.5)
PHOSPHATE SERPL-MCNC: 4.3 MG/DL (ref 2.5–4.5)
TSH SERPL DL<=0.005 MIU/L-ACNC: 3.46 UIU/ML (ref 0.38–5.33)

## 2018-03-02 PROCEDURE — 700102 HCHG RX REV CODE 250 W/ 637 OVERRIDE(OP)

## 2018-03-02 PROCEDURE — 84443 ASSAY THYROID STIM HORMONE: CPT

## 2018-03-02 PROCEDURE — A9270 NON-COVERED ITEM OR SERVICE: HCPCS | Performed by: INTERNAL MEDICINE

## 2018-03-02 PROCEDURE — 84100 ASSAY OF PHOSPHORUS: CPT

## 2018-03-02 PROCEDURE — A9270 NON-COVERED ITEM OR SERVICE: HCPCS | Performed by: HOSPITALIST

## 2018-03-02 PROCEDURE — A9270 NON-COVERED ITEM OR SERVICE: HCPCS

## 2018-03-02 PROCEDURE — 700102 HCHG RX REV CODE 250 W/ 637 OVERRIDE(OP): Performed by: HOSPITALIST

## 2018-03-02 PROCEDURE — 700102 HCHG RX REV CODE 250 W/ 637 OVERRIDE(OP): Performed by: INTERNAL MEDICINE

## 2018-03-02 PROCEDURE — 83735 ASSAY OF MAGNESIUM: CPT

## 2018-03-02 PROCEDURE — 36415 COLL VENOUS BLD VENIPUNCTURE: CPT

## 2018-03-02 PROCEDURE — 99239 HOSP IP/OBS DSCHRG MGMT >30: CPT | Performed by: INTERNAL MEDICINE

## 2018-03-02 PROCEDURE — 82962 GLUCOSE BLOOD TEST: CPT

## 2018-03-02 RX ORDER — CARVEDILOL 6.25 MG/1
6.25 TABLET ORAL 2 TIMES DAILY WITH MEALS
Qty: 60 TAB | Refills: 0 | Status: SHIPPED | OUTPATIENT
Start: 2018-03-02

## 2018-03-02 RX ORDER — ATORVASTATIN CALCIUM 80 MG/1
80 TABLET, FILM COATED ORAL
Qty: 30 TAB | Refills: 0 | Status: SHIPPED | OUTPATIENT
Start: 2018-03-02

## 2018-03-02 RX ORDER — ASPIRIN 81 MG/1
81 TABLET ORAL DAILY
Qty: 30 TAB | Refills: 0 | Status: SHIPPED | OUTPATIENT
Start: 2018-03-03

## 2018-03-02 RX ORDER — LISINOPRIL 10 MG/1
10 TABLET ORAL DAILY
Qty: 30 TAB | Refills: 0 | Status: SHIPPED | OUTPATIENT
Start: 2018-03-03

## 2018-03-02 RX ORDER — SPIRONOLACTONE 25 MG/1
25 TABLET ORAL DAILY
Qty: 30 TAB | Refills: 3 | Status: SHIPPED | OUTPATIENT
Start: 2018-03-03

## 2018-03-02 RX ADMIN — LORAZEPAM 1 MG: 1 TABLET ORAL at 07:04

## 2018-03-02 RX ADMIN — INSULIN HUMAN 1 UNITS: 100 INJECTION, SOLUTION PARENTERAL at 06:05

## 2018-03-02 RX ADMIN — METFORMIN HYDROCHLORIDE 850 MG: 850 TABLET, FILM COATED ORAL at 09:33

## 2018-03-02 RX ADMIN — THERA TABS 1 TABLET: TAB at 09:34

## 2018-03-02 RX ADMIN — FOLIC ACID 1 MG: 1 TABLET ORAL at 09:33

## 2018-03-02 RX ADMIN — SPIRONOLACTONE 25 MG: 25 TABLET, FILM COATED ORAL at 09:33

## 2018-03-02 RX ADMIN — ASPIRIN 81 MG: 81 TABLET, COATED ORAL at 09:33

## 2018-03-02 RX ADMIN — LISINOPRIL 10 MG: 10 TABLET ORAL at 09:33

## 2018-03-02 RX ADMIN — CARVEDILOL 6.25 MG: 6.25 TABLET, FILM COATED ORAL at 09:33

## 2018-03-02 RX ADMIN — PRASUGREL HYDROCHLORIDE 10 MG: 10 TABLET, FILM COATED ORAL at 09:34

## 2018-03-02 RX ADMIN — NICOTINE 21 MG: 21 PATCH, EXTENDED RELEASE TRANSDERMAL at 06:02

## 2018-03-02 RX ADMIN — Medication 100 MG: at 09:34

## 2018-03-02 ASSESSMENT — LIFESTYLE VARIABLES
TREMOR: NO TREMOR
TREMOR: NO TREMOR
HEADACHE, FULLNESS IN HEAD: NOT PRESENT
TREMOR: NO TREMOR
NAUSEA AND VOMITING: NO NAUSEA AND NO VOMITING
AUDITORY DISTURBANCES: NOT PRESENT
TOTAL SCORE: 0
NAUSEA AND VOMITING: NO NAUSEA AND NO VOMITING
ANXIETY: NO ANXIETY (AT EASE)
VISUAL DISTURBANCES: NOT PRESENT
PAROXYSMAL SWEATS: NO SWEAT VISIBLE
HEADACHE, FULLNESS IN HEAD: NOT PRESENT
PAROXYSMAL SWEATS: NO SWEAT VISIBLE
ANXIETY: *
AGITATION: NORMAL ACTIVITY
ANXIETY: NO ANXIETY (AT EASE)
VISUAL DISTURBANCES: NOT PRESENT
AUDITORY DISTURBANCES: NOT PRESENT
ORIENTATION AND CLOUDING OF SENSORIUM: ORIENTED AND CAN DO SERIAL ADDITIONS
TOTAL SCORE: 3
AGITATION: NORMAL ACTIVITY
HEADACHE, FULLNESS IN HEAD: NOT PRESENT
PAROXYSMAL SWEATS: BARELY PERCEPTIBLE SWEATING. PALMS MOIST
ORIENTATION AND CLOUDING OF SENSORIUM: ORIENTED AND CAN DO SERIAL ADDITIONS
AGITATION: NORMAL ACTIVITY
NAUSEA AND VOMITING: NO NAUSEA AND NO VOMITING
ORIENTATION AND CLOUDING OF SENSORIUM: ORIENTED AND CAN DO SERIAL ADDITIONS
TOTAL SCORE: 1
AUDITORY DISTURBANCES: NOT PRESENT
VISUAL DISTURBANCES: NOT PRESENT

## 2018-03-02 ASSESSMENT — ENCOUNTER SYMPTOMS
FEVER: 0
CHILLS: 0
VOMITING: 0
SHORTNESS OF BREATH: 0
PALPITATIONS: 0
NAUSEA: 0
HEADACHES: 0
COUGH: 0

## 2018-03-02 ASSESSMENT — PAIN SCALES - GENERAL
PAINLEVEL_OUTOF10: 0

## 2018-03-02 NOTE — PROGRESS NOTES
Assumed care of Pt A&O 4. Pt resting in bed with no signs of labored breathing. On RA. Tele monitor in place, cardiac rhythm being monitored. Call light within reach, bed in lowest position, upper bed rails up. Pt was updated on plan of care for the night . Will continue to monitor.

## 2018-03-02 NOTE — PROGRESS NOTES
Pt resting in bed at this time. Even visible chest rise. No signs of distress. Call light in place. Bed in low and locked position. Hourly rounding in place.

## 2018-03-02 NOTE — DISCHARGE SUMMARY
DISCHARGE SUMMARY     ADMIT DATE:  2/27/2018         DISCHARGE DATE:  3/2/2018    PATIENT ID:  Name: Brigido Dudley   YOB: 1960  Age: 57 y.o. male   MRN: [id number]  Address: [place] [address]  Phone: [phone] (home)    DISCHARGE DIAGNOSIS:  ST-Elevation Myocardial Infarction  Coronary Artery Disease  Newly Diagnosed Diabetes Mellitus Type II  Alcohol Dependence  Alcohol Withdrawal  Hyponatremia  Tobacco Abuse  Reactive Leukocytosis    CONSULTANTS:  Cardiology    CONDITION:Stable    DISPOSITION:Home    DIET: ADA    ACTIVITY: As tolerated     HPI/HOSPITAL COURSE:  Please see H&P for details regarding initial hospital presentation.  In summary, 58yo F with PMHx of ETOH and Tobacco dependence was admitted for STEMI.  Patient underwent successful PCI with GRISELDA placement in LAD after being found 100% occluded by Cardiology.  Patient was started on ASA/Brilinta/Statin/ACE-I/BB and spironolactone.  Tobacco and ETOH cessation counseling was provided.  Risk stratification testing was performed and patient was found to have elevated A1c suggestive of diabetes mellitus.  Patient was started on Metformin and DM education was provided.  As patient was treated for ETOH withdrawal/CIWA protocol, patient did not experience any overt evidence of withdrawal.  Hyponatremia improved with IVFs.  At this time, patient is medically stable for discharge and recommended to follow up with PCP and Cardiology in 1 week.      Physical exam:  Vitals:    03/01/18 1525 03/01/18 2040 03/02/18 0405 03/02/18 0752   BP: 130/96 115/87 115/87 134/93   Pulse: 92 (!) 102 93 63   Resp: 16 16 16 18   Temp: 35.9 °C (96.7 °F) 36.3 °C (97.4 °F) 36.7 °C (98.1 °F) 36.3 °C (97.4 °F)   SpO2: 95% 95% 94% 96%   Weight:  68.8 kg (151 lb 10.8 oz)     Height:         Weight/BMI: Body mass index is 25.24 kg/m².  Pulse Oximetry: 96 %, O2 (LPM): 0, O2 Delivery: None (Room Air)     Constitutional: He is oriented to person, place, and  time. He appears well-developed and well-nourished. No distress.   HENT:   Head: Normocephalic and atraumatic.   Eyes: Conjunctivae are normal.   Neck: No JVD present.   Cardiovascular: Normal rate.  Exam reveals no gallop.    No murmur heard.  Pulmonary/Chest: Effort normal. No stridor. No respiratory distress. He has no wheezes. He has no rales.   Abdominal: Soft. There is no tenderness. There is no rebound and no guarding.   Musculoskeletal: He exhibits no edema.   Neurological: He is oriented to person, place, and time.   Skin: Skin is warm and dry. No rash noted. He is not diaphoretic.     PROCEDURES:  2/27: Coronary Angiography, Left Heart Catheterization, Left Ventriculogram, PCI of LAD GRISELDA    RADIOLOGY:  ECHOCARDIOGRAM COMP W/O CONT   Final Result      DX-CHEST-LIMITED (1 VIEW)   Final Result      No evidence of acute cardiopulmonary process.          DISCHARGE LABS:    Recent Labs      02/28/18   0400   WBC  9.1   RBC  4.58*   HEMOGLOBIN  15.2   HEMATOCRIT  43.3   MCV  94.5   MCH  33.2*   RDW  40.2   PLATELETCT  209   MPV  9.9     No results found for: RUBLFSCJ99, FOLATE, FERRITIN, IRON, TOTIRONBC    Estimated GFR/CRCL = Estimated Creatinine Clearance: 121.6 mL/min (by C-G formula based on SCr of 0.59 mg/dL).  Recent Labs      02/28/18   0505  03/01/18   0500  03/02/18   0329   SODIUM  133*   --    --    POTASSIUM  4.2   --    --    CHLORIDE  103   --    --    CO2  21   --    --    BUN  10   --    --    CREATININE  0.59   --    --    CALCIUM  8.0*   --    --    MAGNESIUM  1.9  2.1  2.2   PHOSPHORUS  3.0  3.5  4.3       No results for input(s): ALTSGPT, ASTSGOT, ALKPHOSPHAT, TBILIRUBIN, DBILIRUBIN, GAMMAGT, LIPASE, ALBUMIN, GLOBULIN, PREALBUMIN, INR, MACROCYTOSIS in the last 72 hours.    @PNLABRCNT(GLUCOSE:3,POCGLUCOSE:3)  )  Lab Results   Component Value Date    HBA1C 8.5 (H) 02/27/2018       DISCHARGE MEDICATIONS:  (X)  Medication Reconciliation Completed     Medication List      START taking these  medications      Instructions   aspirin 81 MG EC tablet  Start taking on:  3/3/2018   Take 1 Tab by mouth every day.  Dose:  81 mg     atorvastatin 80 MG tablet  Commonly known as:  LIPITOR   Take 1 Tab by mouth every bedtime.  Dose:  80 mg     carvedilol 6.25 MG Tabs  Commonly known as:  COREG   Take 1 Tab by mouth 2 times a day, with meals.  Dose:  6.25 mg     lisinopril 10 MG Tabs  Start taking on:  3/3/2018  Commonly known as:  PRINIVIL   Take 1 Tab by mouth every day.  Dose:  10 mg     metFORMIN 850 MG Tabs  Commonly known as:  GLUCOPHAGE   Take 1 Tab by mouth 2 times a day, with meals.  Dose:  850 mg     spironolactone 25 MG Tabs  Start taking on:  3/3/2018  Commonly known as:  ALDACTONE   Take 1 Tab by mouth every day.  Dose:  25 mg     ticagrelor 90 MG Tabs tablet  Commonly known as:  BRILINTA   Doctor's comments:  Please use savings card, thank you!  Take 1 Tab by mouth 2 Times a Day.  Dose:  90 mg            INSTRUCTIONS:   The patient was instructed to return to the ER in the event of worsening symptoms. I have counseled the patient on the importance of compliance and the patient has agreed to proceed with all medical recommendations and follow up plan indicated above.   The patient understands that all medications come with benefits and risks. Risks may include permanent injury or death and these risks can be minimized with close reassessment and monitoring.        Follow up appointment details :     F/U with Cardiology in 1-2 weeks  F/U with PCP in 1-2 weeks    PENDING STUDIES:  NONE    Primary Care Provider: [facility]      Time spent on discharge day patient visit, preparing discharge paperwork and arranging for patient follow up 44 mins.

## 2018-03-02 NOTE — PROGRESS NOTES
Cardiology Progress Note               Author: Karen Monge, APRN Date & Time created: 3/2/2018  10:14 AM     Interval History:  Pt was admitted for CP for approximate 1 week and lightheadedness. EKG was performed and showed ST elevation in the ant leads. Pt was sent to cath lab and GRISELDA placed to LAD-D1 and PTCA to involved side branch. Pt sitting in chair with no CP or SOB. He is eager to go home and stating he will go AMA if not dc'd soon. Paperwork is being filled out now.     Coronary angiography 2/27/18:  1. 100% occluded mid LAD culprit stenosis  2. LVEF 40% with anteroapical dyskinesis prior to intervention  3. Moderate nonobstructive RCA stenosis  4. Successful PCI culprit LAD-D1 bifurcation with placement of a 3.0 x 38 mm Xience alpine GRISELDA postdilated to 3.5 mm proximally and PTCA of the involved side branch.       TELEMETRY: SR-ST  r PVC  ECHO EF 55% with mid-dist ant hypokinesis    Chief Complaint:  Constant CP and lightheadedness    Review of Systems   Constitutional: Negative for chills and fever.   Respiratory: Negative for cough and shortness of breath.    Cardiovascular: Negative for chest pain and palpitations.   Gastrointestinal: Negative for nausea and vomiting.   Neurological: Negative for headaches.   All other systems reviewed and are negative.      Physical Exam   Constitutional: He is oriented to person, place, and time. He appears well-developed and well-nourished.   HENT:   Head: Normocephalic and atraumatic.   Eyes: EOM are normal.   Neck: Normal range of motion. Neck supple.   Cardiovascular: Normal rate, regular rhythm, normal heart sounds and intact distal pulses.    Pulmonary/Chest: Effort normal and breath sounds normal.   Abdominal: Soft. Bowel sounds are normal.   Musculoskeletal: He exhibits no edema.   Neurological: He is alert and oriented to person, place, and time.   Skin:   Right radial site CDI with ecchymosis.   Psychiatric: He has a normal mood and affect. His  behavior is normal.   Nursing note and vitals reviewed.      Hemodynamics:  Temp (24hrs), Av.4 °C (97.5 °F), Min:35.9 °C (96.7 °F), Max:36.7 °C (98.1 °F)  Temperature: 36.3 °C (97.4 °F)  Pulse  Av.5  Min: 63  Max: 110   Blood Pressure: 134/93, NIBP: 117/81     Respiratory:    Respiration: 18, Pulse Oximetry: 96 %        RUL Breath Sounds: Clear, RML Breath Sounds: Diminished, RLL Breath Sounds: Diminished, IRAJ Breath Sounds: Clear, LLL Breath Sounds: Diminished  Fluids:     Weight: 68.8 kg (151 lb 10.8 oz)  GI/Nutrition:  Orders Placed This Encounter   Procedures   • Diet Order     Standing Status:   Standing     Number of Occurrences:   1     Order Specific Question:   Diet:     Answer:   Cardiac [6]     Lab Results:  Recent Labs      18   0400   WBC  9.1   RBC  4.58*   HEMOGLOBIN  15.2   HEMATOCRIT  43.3   MCV  94.5   MCH  33.2*   MCHC  35.1   RDW  40.2   PLATELETCT  209   MPV  9.9     Recent Labs      18   0505   SODIUM  133*   POTASSIUM  4.2   CHLORIDE  103   CO2  21   GLUCOSE  209*   BUN  10   CREATININE  0.59   CALCIUM  8.0*     Recent Labs      18   1405  18   1754  18   1725   TROPONINI  106.01*  55.58*  18.30*     Medical Decision Making, by Problem:  Active Hospital Problems    Diagnosis   • *Acute ST elevation myocardial infarction (STEMI) of anterior wall (CMS-HCC) [I21.09]   • Alcohol use [Z78.9]   • Smoker [F17.200]   • Hyperglycemia [R73.9]   • Leukocytosis [D72.829]   • Hyponatremia [E87.1]     Plan:  1. STEMI  -Effient will be changed to brillinta due to available coupon for free 30 day supply  -discussed the absolute importance of taking Brillinta(coupon given for 30 days free) and asa 81mg.    -Pt concerned about obtaining medications as he doesn't drive. Voucher provided  -cont lipitor 80 mg nightly, coreg 6.25mg bid, lisinopril 10 mg daily, Spironolactone 25 mg daily.    2. Pt informed of the significant importance of follow up with cardiology in 1-2 weeks.      We will sign off for now. Importance of DAPT and f/u stressed to pt.      Quality-Core Measures   Reviewed items::  EKG reviewed, Radiology images reviewed, Labs reviewed and Medications reviewed  Rod catheter::  No Rod  DVT prophylaxis pharmacological::  Not indicated at this time, ambulatory  DVT prophylaxis - mechanical:  Not indicated at this time, ambulatory  Ulcer Prophylaxis::  Not indicated

## 2018-03-02 NOTE — PROGRESS NOTES
"Pt refusing his midnight vitals. Pt was educated about why vitals were needed and floor protocol that requires vital signs Q4 hours. Pt continues to refuse stating \"NO!'.  "

## 2018-03-02 NOTE — CARE PLAN
Problem: Communication  Goal: The ability to communicate needs accurately and effectively will improve  Outcome: PROGRESSING AS EXPECTED  Pt encouraged to ask questions about treatment plan, diagnosis, medications and exams related to his admission.    Problem: Knowledge Deficit  Goal: Knowledge of disease process/condition, treatment plan, diagnostic tests, and medications will improve  Outcome: PROGRESSING AS EXPECTED  Pt educated about disease process. Reason why medications are taken. And informed about treatment plan.

## 2018-03-02 NOTE — PROGRESS NOTES
Report received at bedside from RN. Patient A & O x 4. Patient resting in bed. No acute distress. Patient complains of pain 0/10. No behavioral pain indicators noted.  No SOB/dyspnea noted. No cough noted. Patient denies chest pain/palpitations.  Patient denies nausea. Abdomen: nondistended, nontender. + bowel sounds in all quadrants. Patient passing flatus.  No S/S hypoglycemia/hyperglycemia noted.  Skin: Intact Activity: Up self   Fall and safety precautions maintained.Hourly rounding in progress.  Patient expressing anger about not being discharged at 0800 and [facility]. Explained discharge process and medication refill process as well as ride options. No learning evidenced.

## 2018-03-02 NOTE — DISCHARGE INSTRUCTIONS
Discharge Instructions    Discharged to home by taxi with self. Discharged via walking, hospital escort: Refused.  Special equipment needed: Not Applicable    Be sure to schedule a follow-up appointment with your primary care doctor or any specialists as instructed.     Discharge Plan:   Diet Plan: Discussed  Activity Level: Discussed  Smoking Cessation Offered: Patient Counseled  Confirmed Follow up Appointment: Appointment Scheduled  Confirmed Symptoms Management: Discussed  Medication Reconciliation Updated: Yes  Influenza Vaccine Indication: Patient Refuses    I understand that a diet low in cholesterol, fat, and sodium is recommended for good health. Unless I have been given specific instructions below for another diet, I accept this instruction as my diet prescription.   Other diet: Heart Healthy     Special Instructions: Diagnosis:  Acute Coronary Syndrome (ACS) is a diagnosis that encompasses cardiac-related chest pain and heart attack. ACS occurs when the blood flow to the heart muscle is severely reduced or cut off completely due to a slow process called atherosclerosis.  Atherosclerosis is a disease in which the coronary arteries become narrow from a buildup of fat, cholesterol, and other substances that combine to form plaque. If the plaque breaks, a blood clot will form and block the blood flow to the heart muscle. This lack of blood flow can cause damage or death to the heart muscle which is called a heart attack or Myocardial Infarction (MI). There are two different types of MIs:  ST Elevation Myocardial Infarction or STEMI (the most severe type of heart attack) and Non-ST Elevation Myocardial Infarction or NSTEMI.    Treatment Plan:  · Cardiac Diet  - Low fat, low salt, low cholesterol   · Cardiac Rehab  - Your doctor has ordered you a referral to [facility].  Call [phone] to schedule an appointment.  · Attend my follow-up appointment with my Cardiologist.  · Take my medications as prescribed by  my doctor  · Exercise daily  · Quit Smoking, Lower my bad cholesterol and raise my good cholesterol, lower my blood pressure, Reduce stress and Control my diabetes    Medications:  Certain medications are used to treat ACS.  Remember to always take medications as prescribed and never stop talking medications unless told by your doctor.    You have been prescribed the following medicatons:    Aspirin - Aspirin is used as a blood thinning medication and you will require this medication indefinitely.  Anti-platelet/blood thinner - Your Anti-platelet/Blood thinning medication is called Plavix, and is used in combination with aspirin to prevent clots from forming in your heart and/or around your stent.  Your doctor will determine how long you need to be on this medicine.  Beta-Blocker - Beta-Blocker carvedolol is used to lower blood pressure and heart rate, and/or helps your heart heal after a heart attack.  Angiotensin Converting Enzyme (ACE) Inhibitor - Angiotensin Converting Enzyme Inhibitor Lisinipril is used to lower blood pressure and treat heart failure.  Nitroglycerine - Nitroglycerine is used to relieve chest pain.    · Is patient discharged on Warfarin / Coumadin?   No     Depression / Suicide Risk    As you are discharged from this [facility] facility, it is important to learn how to keep safe from harming yourself.    Recognize the warning signs:  · Abrupt changes in personality, positive or negative- including increase in energy   · Giving away possessions  · Change in eating patterns- significant weight changes-  positive or negative  · Change in sleeping patterns- unable to sleep or sleeping all the time   · Unwillingness or inability to communicate  · Depression  · Unusual sadness, discouragement and loneliness  · Talk of wanting to die  · Neglect of personal appearance   · Rebelliousness- reckless behavior  · Withdrawal from people/activities they love  · Confusion- inability to concentrate     If you  or a loved one observes any of these behaviors or has concerns about self-harm, here's what you can do:  · Talk about it- your feelings and reasons for harming yourself  · Remove any means that you might use to hurt yourself (examples: pills, rope, extension cords, firearm)  · Get professional help from the community (Mental Health, Substance Abuse, psychological counseling)  · Do not be alone:Call your Safe Contact- someone whom you trust who will be there for you.  · Call your local CRISIS HOTLINE 188-5368 or 010-252-0517  · Call your local Children's Mobile Crisis Response Team [place] (363) 247-5623 or www.[place]  · Call the toll free National Suicide Prevention Hotlines   · National Suicide Prevention Lifeline 772-016-TJEG (2325)  · [place] Line Network 800-SUICIDE (310-1483)      Acute Coronary Syndrome  Acute coronary syndrome (ACS) is a serious problem in which there is suddenly not enough blood and oxygen supplied to the heart. ACS may mean that one or more of the blood vessels in your heart (coronary arteries) may be blocked. ACS can result in chest pain or a heart attack (myocardial infarction or MI).  What are the causes?  This condition is caused by atherosclerosis, which is the buildup of fat and cholesterol (plaque) on the inside of the arteries. Over time, the plaque may narrow or block the artery, and this will lessen blood flow to the heart. Plaque can also become weak and break off within a coronary artery to form a clot and cause a sudden blockage.  What increases the risk?  The risk factors of this condition include:  · High cholesterol levels.  · High blood pressure (hypertension).  · Smoking.  · Diabetes.  · Age.  · Family history of chest pain, heart disease, or stroke.  · Lack of exercise.  What are the signs or symptoms?  The most common signs of this condition include:  · Chest pain, which can be:  ¨ A crushing or squeezing in the chest.  ¨ A tightness, pressure,  fullness, or heaviness in the chest.  ¨ Present for more than a few minutes, or it can stop and recur.  · Pain in the arms, neck, jaw, or back.  · Unexplained heartburn or indigestion.  · Shortness of breath.  · Nausea.  · Sudden cold sweats.  · Feeling light-headed or dizzy.  Sometimes, this condition has no symptoms.  How is this diagnosed?  ACS may be diagnosed through the following tests:  · Electrocardiogram (ECG).  · Blood tests.  · Coronary angiogram. This is a procedure to look at the coronary arteries to see if there is any blockage.  How is this treated?  Treatment for ACS may include:  · Healthy behavioral changes to reduce or control risk factors.  · Medicine.  · Coronary stenting. A stent helps to keep an artery open.  · Coronary angioplasty. This procedure widens a narrowed or blocked artery.  · Coronary artery bypass surgery. This will allow your blood to pass the blockage (bypass) to reach your heart.  Follow these instructions at home:  Eating and drinking  · Follow a heart-healthy diet. A dietitian can you help to educate you about healthy food options and changes.  · Use healthy cooking methods such as roasting, grilling, broiling, baking, poaching, steaming, or stir-frying. Talk to a dietitian to learn more about healthy cooking methods.  Medicines  · Take medicines only as directed by your health care provider.  · Do not take the following medicines unless your health care provider approves:  ¨ Nonsteroidal anti-inflammatory drugs (NSAIDs), such as ibuprofen, naproxen, or celecoxib.  ¨ Vitamin supplements that contain vitamin A, vitamin E, or both.  ¨ Hormone replacement therapy that contains estrogen with or without progestin.  · Stop illegal drug use.  Activity  · Follow an exercise program that is approved by your health care provider.  · Plan rest periods when you are fatigued.  Lifestyle  · Do not use any tobacco products, including cigarettes, chewing tobacco, or electronic cigarettes. If  you need help quitting, ask your health care provider.  · If you drink alcohol, and your health care provider approves, limit your alcohol intake to no more than 1 drink per day. One drink equals 12 ounces of beer, 5 ounces of wine, or 1½ ounces of hard liquor.  · Learn to manage stress.  · Maintain a healthy weight. Lose weight as approved by your health care provider.  General instructions  · Manage other health conditions, such as hypertension and diabetes, as directed by your health care provider.  · Keep all follow-up visits as directed by your health care provider. This is important.  · Your health care provider may ask you to monitor your blood pressure. A blood pressure reading consists of a higher number over a lower number, such as 110 over 72, written as 110/72. Ideally, your blood pressure should be:  ¨ Below 140/90 if you have no other medical conditions.  ¨ Below 130/80 if you have diabetes or kidney disease.  Get help right away if:  · You have pain in your chest, neck, arm, jaw, stomach, or back that lasts more than a few minutes, is recurring, or is not relieved by taking medicine under your tongue (sublingual nitroglycerin).  · You have profuse sweating without cause.  · You have unexplained:  ¨ Heartburn or indigestion.  ¨ Shortness of breath or difficulty breathing.  ¨ Nausea or vomiting.  ¨ Fatigue.  ¨ Feelings of nervousness or anxiety.  ¨ Weakness.  ¨ Diarrhea.  · You have sudden light-headedness or dizziness.  · You faint.  These symptoms may represent a serious problem that is an emergency. Do not wait to see if the symptoms will go away. Get medical help right away. Call your local emergency services (911 in the [place] Do not drive yourself to the clinic or hospital.   This information is not intended to replace advice given to you by your health care provider. Make sure you discuss any questions you have with your health care provider.  Document Released: 12/18/2006 Document Revised:  05/31/2017 Document Reviewed: 04/21/2015  [employer] Interactive Patient Education © 2017 [employer]    Carotid Angioplasty With Stent  Introduction  Carotid angioplasty is a procedure to open or widen an artery in the neck (carotid artery) that is blocked or has become narrow. This is done by using a small piece of metal that looks like a coil or spring (stent). The stent helps keep the artery open by supporting the artery walls.  The carotid arteries supply blood to the brain. When fats, cholesterol, and other materials (plaque) build up in an artery, the artery becomes narrow and can become blocked. This can reduce or block blood flow to certain areas of the brain, which can cause serious health problems, including stroke.  Tell a health care provider about:  · Any allergies you have.  · All medicines you are taking, including vitamins, herbs, eye drops, creams, and over-the-counter medicines.  · Any problems you or family members have had with anesthetic medicines.  · Any blood disorders you have.  · Any surgeries you have had.  · Any medical conditions you have.  · Whether you are pregnant or may be pregnant.  What are the risks?  Generally, this is a safe procedure. However, problems may occur, including:  · Infection.  · Bleeding.  · Allergic reactions to medicines or dyes.  · Damage to other structures or organs, or the carotid artery itself.  · The carotid artery becoming blocked again.  · A collection of blood under the skin (hematoma) around the stent site that gets larger (expands).  · Blood clot in another part of the body.  · Kidney injury.  What happens before the procedure?  · Ask your health care provider about:  ¨ Changing or stopping your regular medicines. This is especially important if you are taking diabetes medicines or blood thinners.  ¨ Taking medicines such as aspirin and ibuprofen. These medicines can thin your blood. Do not take these medicines before your procedure if your health care  provider instructs you not to.  · Follow instructions from your health care provider about eating or drinking restrictions.  · Do not use any tobacco products for at least 24 hours before your procedure. This includes cigarettes, chewing tobacco, or e-cigarettes.  · Ask your health care provider how your surgical site will be marked or identified.  · You may be given antibiotic medicine to help prevent infection.  · You may have blood tests done.  · Plan to have someone take you home after the procedure.  · If you will be going home right after the procedure, plan to have someone with you for 24 hours.  What happens during the procedure?  · To reduce your risk of infection:  ¨ Your health care team will wash or sanitize their hands.  ¨ Your skin will be washed with soap.  · An IV tube will be inserted into one of your veins.  · You will be given one or more of the following:  ¨ A medicine to help you relax (sedative).  ¨ A medicine to make you fall asleep (general anesthetic).  · An cut (incision) will be made. Most commonly, an incision will be made in your groin. In some cases, an incision may be made in your wrist or forearm instead of your groin.  · A small, flexible tube (catheter) will be inserted through your incision, into an artery. The catheter will be threaded upward into your carotid artery. An X-ray machine (fluoroscope) will help your health care provider guide the catheter to the correct place in your artery.  · Dye will be injected into the catheter and will travel to the narrow or blocked part of your carotid artery.  · X-ray images will be taken of how the dye flows through your artery. While images are being taken, you may be given instructions about breathing, swallowing, moving, or talking.  · A filter (distal protection device) will be inserted into your artery. This will be used to catch plaque that comes loose in your artery during the procedure. This prevents plaque from moving into your  brain.  · A small balloon will be inserted into your artery. The balloon will be inflated for a few seconds to widen your artery and then removed.  · The stent will be placed in your artery.  · A second small balloon will be inserted into your artery and inflated. This expands the stent inside of your artery, so that the stent holds up the artery walls. The balloon will then be removed.  · The catheter and the distal protection device will be removed from your artery.  · Your incision may be closed with stitches (sutures), skin glue, or adhesive tape.  · A bandage (dressing) will be placed over your incision.  The procedure may vary among health care providers and hospitals.  What happens after the procedure?  · Your blood pressure, heart rate, breathing rate, and blood oxygen level will be monitored often until the medicines you were given have worn off.  · You may continue to receive fluids and medicines through an IV tube.  · You may have some pain. Pain medicines will be available to help you.  · You may have X-rays to make sure that the stent is in the correct place.  · You may have to wear compression stockings. These stockings help to prevent blood clots and reduce swelling in your legs.  · Do not drive for 24 hours if you received a sedative.  This information is not intended to replace advice given to you by your health care provider. Make sure you discuss any questions you have with your health care provider.  Document Released: 05/01/2006 Document Revised: 05/25/2017 Document Reviewed: 09/11/2016  © 2017 [employer]      Type 2 Diabetes Mellitus, Diagnosis, Adult  Type 2 diabetes (type 2 diabetes mellitus) is a long-term (chronic) disease. In type 2 diabetes, one or both of these problems may be present:  · The pancreas does not make enough of a hormone called insulin.  · Cells in the body do not respond properly to insulin that the body makes (insulin resistance).  Normally, insulin allows blood sugar  (glucose) to enter cells in the body. The cells use glucose for energy. Insulin resistance or lack of insulin causes excess glucose to build up in the blood instead of going into cells. As a result, high blood glucose (hyperglycemia) develops.  What increases the risk?  The following factors may make you more likely to develop type 2 diabetes:  · Having a family member with type 2 diabetes.  · Being overweight or obese.  · Having an inactive (sedentary) lifestyle.  · Having been diagnosed with insulin resistance.  · Having a history of prediabetes, gestational diabetes, or polycystic ovarian syndrome (PCOS).  · Being of American-, -American, /, or / descent.  What are the signs or symptoms?  In the early stage of this condition, you may not have symptoms. Symptoms develop slowly and may include:  · Increased thirst (polydipsia).  · Increased hunger (polyphagia).  · Increased urination (polyuria).  · Increased urination during the night (nocturia).  · Unexplained weight loss.  · Frequent infections that keep coming back (recurring).  · Fatigue.  · Weakness.  · Vision changes, such as blurry vision.  · Cuts or bruises that are slow to heal.  · Tingling or numbness in the hands or feet.  · Dark patches on the skin (acanthosis nigricans).  How is this diagnosed?     This condition is diagnosed based on your symptoms, your medical history, a physical exam, and your blood glucose level. Your blood glucose may be checked with one or more of the following blood tests:  · A fasting blood glucose (FBG) test. You will not be allowed to eat (you will fast) for at least 8 hours before a blood sample is taken.  · A random blood glucose test. This checks blood glucose at any time of day regardless of when you ate.  · An A1c (hemoglobin A1c) blood test. This provides information about blood glucose control over the previous 2-3 months.  · An oral glucose tolerance test (OGTT). This  measures your blood glucose at two times:  ¨ After fasting. This is your baseline blood glucose level.  ¨ Two hours after drinking a beverage that contains glucose.  You may be diagnosed with type 2 diabetes if:  · Your FBG level is 126 mg/dL (7.0 mmol/L) or higher.  · Your random blood glucose level is 200 mg/dL (11.1 mmol/L) or higher.  · Your A1c level is 6.5% or higher.  · Your OGGT result is higher than 200 mg/dL (11.1 mmol/L).  These blood tests may be repeated to confirm your diagnosis.  How is this treated?  Your treatment may be managed by a specialist called an endocrinologist. Type 2 diabetes may be treated by following instructions from your health care provider about:  · Making diet and lifestyle changes. This may include:  ¨ Following an individualized nutrition plan that is developed by a diet and nutrition specialist (registered dietitian).  ¨ Exercising regularly.  ¨ Finding ways to manage stress.  · Checking your blood glucose level as often as directed.  · Taking diabetes medicines or insulin daily. This helps to keep your blood glucose levels in the healthy range.  ¨ If you use insulin, you may need to adjust the dosage depending on how physically active you are and what foods you eat. Your health care provider will tell you how to adjust your dosage.  · Taking medicines to help prevent complications from diabetes, such as:  ¨ Aspirin.  ¨ Medicine to lower cholesterol.  ¨ Medicine to control blood pressure.  Your health care provider will set individualized treatment goals for you. Your goals will be based on your age, other medical conditions you have, and how you respond to diabetes treatment. Generally, the goal of treatment is to maintain the following blood glucose levels:  · Before meals (preprandial):  mg/dL (4.4-7.2 mmol/L).  · After meals (postprandial): below 180 mg/dL (10 mmol/L).  · A1c level: less than 7%.  Follow these instructions at home:  Questions to Ask Your Health Care  Provider   Consider asking the following questions:  · Do I need to meet with a diabetes educator?  · Where can I find a support group for people with diabetes?  · What equipment will I need to manage my diabetes at home?  · What diabetes medicines do I need, and when should I take them?  · How often do I need to check my blood glucose?  · What number can I call if I have questions?  · When is my next appointment?  General instructions  · Take over-the-counter and prescription medicines only as told by your health care provider.  · Keep all follow-up visits as told by your health care provider. This is important.  · For more information about diabetes, visit:  ¨ American Diabetes Association (ADA): www.diabetes.org  ¨ American Association of Diabetes Educators (AADE): www.diabeteseducator.org/patient-resources  Contact a health care provider if:  · Your blood glucose is at or above 240 mg/dL (13.3 mmol/L) for 2 days in a row.  · You have been sick or have had a fever for 2 days or longer and you are not getting better.  · You have any of the following problems for more than 6 hours:  ¨ You cannot eat or drink.  ¨ You have nausea and vomiting.  ¨ You have diarrhea.  Get help right away if:  · Your blood glucose is lower than 54 mg/dL (3.0 mmol/L).  · You become confused or you have trouble thinking clearly.  · You have difficulty breathing.  · You have moderate or large ketone levels in your urine.  This information is not intended to replace advice given to you by your health care provider. Make sure you discuss any questions you have with your health care provider.  Document Released: 12/18/2006 Document Revised: 05/25/2017 Document Reviewed: 01/20/2017  [employer] Interactive Patient Education © 2017 [employer]

## 2018-03-02 NOTE — CONSULTS
"Diabetes education: Met with Brigido to discuss new dx of diabetes. Pt states he had been dx, and [facility]   [facility], was \"[facility]verted\" [facility] when having \"my [facility]\".  CDE attempted to begin education and patient discussed his limitations with food as he lives alone and does not have access to a lot of food. Discussed \"Food is medicine\" prescription.  Pt asked about how he was getting his medications and when he was told he would have [facility] to [facility] them [facility], he became agitated. \" I am going to have another heart attack\".  \"You mean I have to [facility] to the [facility] in the [facility]!\"  CDE attempted to explain the process.   CDE spoke with Mayda OROZCO regarding pt's lack of transportation, his home situation ( his address is not listed on the chart), food issue with the offering of the Food is medicine prescription, and ability to get his medications. She will follow up. CDE will follow tomorrow and bring the prescription. Pt does not want a meter but did listen to education on Metformin. Please call [phone] if needs change.  "

## 2018-03-02 NOTE — PROGRESS NOTES
Patient discharged home via taxi with self. Taxi voucher provided and fill medication of Brilinta given to patient. All belongings accounted for and discharge education provided, signed and in chart. Pharmacy updated and patient provided with appropriate scripts. Charge RN notified and Core measures complete.

## 2018-03-02 NOTE — PROGRESS NOTES
Patient transferred to floor from [facility] via , escorted by previous Rn, Emre. Tele monitor shows patient in SR [id number]. Reports no pain. Independent, steady gait. See flowsheet for assessment. Pt asked for IV to be removed and explained floor policy, patient stated understanding. Bed locked and in lowest position. Call light in reach.

## 2018-06-22 NOTE — TELEPHONE ENCOUNTER
"Meds to bed program.     Provided pt with Brilinta 90mg samples for pt at bedside.   Pt didn't stay for consultation. Pt left as he \"needed a cigarette\"    Angelique Meyers, PharmD    "
decreased ROM/pain

## 2021-03-15 DIAGNOSIS — Z23 NEED FOR VACCINATION: ICD-10-CM
